# Patient Record
Sex: FEMALE | Race: WHITE | Employment: UNEMPLOYED | URBAN - NONMETROPOLITAN AREA
[De-identification: names, ages, dates, MRNs, and addresses within clinical notes are randomized per-mention and may not be internally consistent; named-entity substitution may affect disease eponyms.]

---

## 2018-09-27 ENCOUNTER — HOSPITAL ENCOUNTER (EMERGENCY)
Age: 33
Discharge: HOME | End: 2018-09-27
Payer: MEDICAID

## 2018-09-27 ENCOUNTER — APPOINTMENT (OUTPATIENT)
Dept: GENERAL RADIOLOGY | Age: 33
End: 2018-09-27
Payer: MEDICAID

## 2018-09-27 VITALS
SYSTOLIC BLOOD PRESSURE: 126 MMHG | DIASTOLIC BLOOD PRESSURE: 58 MMHG | HEART RATE: 98 BPM | BODY MASS INDEX: 35.01 KG/M2 | RESPIRATION RATE: 16 BRPM | OXYGEN SATURATION: 98 % | HEIGHT: 68 IN | WEIGHT: 231 LBS | TEMPERATURE: 98.5 F

## 2018-09-27 DIAGNOSIS — H65.192 OTHER ACUTE NONSUPPURATIVE OTITIS MEDIA OF LEFT EAR, RECURRENCE NOT SPECIFIED: Primary | ICD-10-CM

## 2018-09-27 DIAGNOSIS — F17.200 TOBACCO DEPENDENCE: ICD-10-CM

## 2018-09-27 DIAGNOSIS — J40 BRONCHITIS: ICD-10-CM

## 2018-09-27 PROCEDURE — 99284 EMERGENCY DEPT VISIT MOD MDM: CPT

## 2018-09-27 PROCEDURE — 93005 ELECTROCARDIOGRAM TRACING: CPT

## 2018-09-27 PROCEDURE — 6370000000 HC RX 637 (ALT 250 FOR IP): Performed by: EMERGENCY MEDICINE

## 2018-09-27 PROCEDURE — 99283 EMERGENCY DEPT VISIT LOW MDM: CPT | Performed by: NURSE PRACTITIONER

## 2018-09-27 PROCEDURE — 94640 AIRWAY INHALATION TREATMENT: CPT

## 2018-09-27 PROCEDURE — 71046 X-RAY EXAM CHEST 2 VIEWS: CPT

## 2018-09-27 RX ORDER — CETIRIZINE HYDROCHLORIDE 10 MG/1
10 TABLET ORAL DAILY
Qty: 30 TABLET | Refills: 0 | Status: SHIPPED | OUTPATIENT
Start: 2018-09-27 | End: 2020-11-04

## 2018-09-27 RX ORDER — BUPRENORPHINE AND NALOXONE 8; 2 MG/1; MG/1
1 FILM, SOLUBLE BUCCAL; SUBLINGUAL 2 TIMES DAILY
Status: ON HOLD | COMMUNITY
End: 2020-10-20

## 2018-09-27 RX ORDER — GABAPENTIN 600 MG/1
800 TABLET ORAL 3 TIMES DAILY
Status: ON HOLD | COMMUNITY
End: 2020-10-20

## 2018-09-27 RX ORDER — IPRATROPIUM BROMIDE AND ALBUTEROL SULFATE 2.5; .5 MG/3ML; MG/3ML
1 SOLUTION RESPIRATORY (INHALATION) ONCE
Status: COMPLETED | OUTPATIENT
Start: 2018-09-27 | End: 2018-09-27

## 2018-09-27 RX ORDER — GUAIFENESIN 600 MG/1
1200 TABLET, EXTENDED RELEASE ORAL 2 TIMES DAILY
Qty: 60 TABLET | Refills: 0 | Status: SHIPPED | OUTPATIENT
Start: 2018-09-27 | End: 2018-10-12

## 2018-09-27 RX ORDER — PREDNISONE 20 MG/1
60 TABLET ORAL ONCE
Status: COMPLETED | OUTPATIENT
Start: 2018-09-27 | End: 2018-09-27

## 2018-09-27 RX ORDER — ALBUTEROL SULFATE 90 UG/1
2 AEROSOL, METERED RESPIRATORY (INHALATION) 4 TIMES DAILY
Qty: 1 INHALER | Refills: 0 | Status: ON HOLD | OUTPATIENT
Start: 2018-09-27 | End: 2020-10-20

## 2018-09-27 RX ORDER — AMOXICILLIN 500 MG/1
500 CAPSULE ORAL 3 TIMES DAILY
Qty: 30 CAPSULE | Refills: 0 | Status: SHIPPED | OUTPATIENT
Start: 2018-09-27 | End: 2018-10-07

## 2018-09-27 RX ORDER — METHYLPREDNISOLONE 4 MG/1
TABLET ORAL
Qty: 1 KIT | Refills: 0 | Status: ON HOLD | OUTPATIENT
Start: 2018-09-27 | End: 2020-10-20

## 2018-09-27 RX ADMIN — PREDNISONE 60 MG: 20 TABLET ORAL at 06:46

## 2018-09-27 RX ADMIN — IPRATROPIUM BROMIDE AND ALBUTEROL SULFATE 1 AMPULE: .5; 3 SOLUTION RESPIRATORY (INHALATION) at 06:42

## 2018-09-27 ASSESSMENT — PAIN SCALES - GENERAL: PAINLEVEL_OUTOF10: 5

## 2018-09-27 ASSESSMENT — ENCOUNTER SYMPTOMS
COUGH: 1
ABDOMINAL PAIN: 1
VOMITING: 0
SHORTNESS OF BREATH: 1
SINUS PAIN: 1
SORE THROAT: 1
SINUS PRESSURE: 1
WHEEZING: 1
TROUBLE SWALLOWING: 0
DIARRHEA: 0

## 2018-09-27 ASSESSMENT — PAIN DESCRIPTION - LOCATION: LOCATION: CHEST

## 2018-09-27 ASSESSMENT — PAIN DESCRIPTION - DESCRIPTORS: DESCRIPTORS: THROBBING

## 2018-09-27 NOTE — ED PROVIDER NOTES
oropharyngeal erythema or tonsillar abscesses. Eyes: Pupils are equal, round, and reactive to light. EOM are normal. Right eye exhibits no discharge. Left eye exhibits no discharge. No scleral icterus. Neck: Normal range of motion. No tracheal deviation present. Cardiovascular: Normal rate, regular rhythm and normal heart sounds. No murmur heard. Pulmonary/Chest: Effort normal. No stridor. No respiratory distress. She has wheezes. She has rales. She exhibits no tenderness. Abdominal: Soft. Bowel sounds are normal. She exhibits mass (Umbilical hernia noted. Retractable and soft. ). She exhibits no distension. There is no tenderness. Musculoskeletal: Normal range of motion. Lymphadenopathy:     She has no cervical adenopathy. Neurological: She is alert and oriented to person, place, and time. Skin: Skin is warm and dry. No rash noted. She is not diaphoretic. No erythema. No pallor. Psychiatric: She has a normal mood and affect. Her behavior is normal. Judgment and thought content normal.   Nursing note and vitals reviewed. DIAGNOSTIC RESULTS     RADIOLOGY:   Non-plain film images such as CT, Ultrasound and MRI are read by the radiologist. Plain radiographic images are visualized and preliminarily interpreted by No att. providers found with the below findings:        Interpretation per the Radiologist below, if available at the time of this note:    XR CHEST STANDARD (2 VW)   Final Result   No active cardiopulmonary disease. The above finding are recorded on a digital voice clip in PACS. Signed by Dr Reji Archuleta on 9/27/2018 7:23 AM          LABS:  Labs Reviewed - No data to display    All other labs were within normal range or not returned as of this dictation.     RE-ASSESSMENT          EMERGENCY DEPARTMENT COURSE and DIFFERENTIAL DIAGNOSIS/MDM:   Vitals:    Vitals:    09/27/18 0627 09/27/18 0629 09/27/18 0830   BP:  (!) 141/60 (!) 126/58   Pulse:  100 98   Resp:  20 16   Temp:  98.4

## 2020-04-05 ENCOUNTER — HOSPITAL ENCOUNTER (EMERGENCY)
Facility: HOSPITAL | Age: 35
Discharge: HOME | End: 2020-04-05
Admitting: EMERGENCY MEDICINE

## 2020-04-05 ENCOUNTER — APPOINTMENT (OUTPATIENT)
Dept: CT IMAGING | Facility: HOSPITAL | Age: 35
End: 2020-04-05

## 2020-04-05 VITALS
TEMPERATURE: 98.1 F | BODY MASS INDEX: 37.89 KG/M2 | HEIGHT: 68 IN | HEART RATE: 63 BPM | WEIGHT: 250 LBS | DIASTOLIC BLOOD PRESSURE: 90 MMHG | RESPIRATION RATE: 18 BRPM | OXYGEN SATURATION: 97 % | SYSTOLIC BLOOD PRESSURE: 135 MMHG

## 2020-04-05 DIAGNOSIS — L03.211 FACIAL CELLULITIS: ICD-10-CM

## 2020-04-05 DIAGNOSIS — K04.7 DENTAL ABSCESS: Primary | ICD-10-CM

## 2020-04-05 DIAGNOSIS — Z72.0 TOBACCO USE: ICD-10-CM

## 2020-04-05 LAB
ALBUMIN SERPL-MCNC: 3.8 G/DL (ref 3.5–5.2)
ALBUMIN/GLOB SERPL: 1.4 G/DL
ALP SERPL-CCNC: 89 U/L (ref 39–117)
ALT SERPL W P-5'-P-CCNC: 20 U/L (ref 1–33)
ANION GAP SERPL CALCULATED.3IONS-SCNC: 14 MMOL/L (ref 5–15)
AST SERPL-CCNC: 14 U/L (ref 1–32)
BASOPHILS # BLD AUTO: 0.03 10*3/MM3 (ref 0–0.2)
BASOPHILS NFR BLD AUTO: 0.3 % (ref 0–1.5)
BILIRUB SERPL-MCNC: <0.2 MG/DL (ref 0.2–1.2)
BUN BLD-MCNC: 10 MG/DL (ref 6–20)
BUN/CREAT SERPL: 17.9 (ref 7–25)
CALCIUM SPEC-SCNC: 8.5 MG/DL (ref 8.6–10.5)
CHLORIDE SERPL-SCNC: 103 MMOL/L (ref 98–107)
CO2 SERPL-SCNC: 27 MMOL/L (ref 22–29)
CREAT BLD-MCNC: 0.56 MG/DL (ref 0.57–1)
DEPRECATED RDW RBC AUTO: 48 FL (ref 37–54)
EOSINOPHIL # BLD AUTO: 0.34 10*3/MM3 (ref 0–0.4)
EOSINOPHIL NFR BLD AUTO: 3 % (ref 0.3–6.2)
ERYTHROCYTE [DISTWIDTH] IN BLOOD BY AUTOMATED COUNT: 14.9 % (ref 12.3–15.4)
GFR SERPL CREATININE-BSD FRML MDRD: 125 ML/MIN/1.73
GLOBULIN UR ELPH-MCNC: 2.8 GM/DL
GLUCOSE BLD-MCNC: 130 MG/DL (ref 65–99)
HCT VFR BLD AUTO: 37.2 % (ref 34–46.6)
HGB BLD-MCNC: 12.2 G/DL (ref 12–15.9)
HOLD SPECIMEN: NORMAL
HOLD SPECIMEN: NORMAL
IMM GRANULOCYTES # BLD AUTO: 0.03 10*3/MM3 (ref 0–0.05)
IMM GRANULOCYTES NFR BLD AUTO: 0.3 % (ref 0–0.5)
LYMPHOCYTES # BLD AUTO: 3.89 10*3/MM3 (ref 0.7–3.1)
LYMPHOCYTES NFR BLD AUTO: 34.1 % (ref 19.6–45.3)
MCH RBC QN AUTO: 29 PG (ref 26.6–33)
MCHC RBC AUTO-ENTMCNC: 32.8 G/DL (ref 31.5–35.7)
MCV RBC AUTO: 88.6 FL (ref 79–97)
MONOCYTES # BLD AUTO: 0.91 10*3/MM3 (ref 0.1–0.9)
MONOCYTES NFR BLD AUTO: 8 % (ref 5–12)
NEUTROPHILS # BLD AUTO: 6.21 10*3/MM3 (ref 1.7–7)
NEUTROPHILS NFR BLD AUTO: 54.3 % (ref 42.7–76)
NRBC BLD AUTO-RTO: 0 /100 WBC (ref 0–0.2)
PLATELET # BLD AUTO: 219 10*3/MM3 (ref 140–450)
PMV BLD AUTO: 10.6 FL (ref 6–12)
POTASSIUM BLD-SCNC: 3.1 MMOL/L (ref 3.5–5.2)
PROT SERPL-MCNC: 6.6 G/DL (ref 6–8.5)
RBC # BLD AUTO: 4.2 10*6/MM3 (ref 3.77–5.28)
SODIUM BLD-SCNC: 144 MMOL/L (ref 136–145)
WBC NRBC COR # BLD: 11.41 10*3/MM3 (ref 3.4–10.8)
WHOLE BLOOD HOLD SPECIMEN: NORMAL
WHOLE BLOOD HOLD SPECIMEN: NORMAL

## 2020-04-05 PROCEDURE — 25010000002 DEXAMETHASONE PER 1 MG: Performed by: NURSE PRACTITIONER

## 2020-04-05 PROCEDURE — 96375 TX/PRO/DX INJ NEW DRUG ADDON: CPT

## 2020-04-05 PROCEDURE — 80053 COMPREHEN METABOLIC PANEL: CPT | Performed by: NURSE PRACTITIONER

## 2020-04-05 PROCEDURE — 25010000002 ONDANSETRON PER 1 MG: Performed by: NURSE PRACTITIONER

## 2020-04-05 PROCEDURE — 25010000003 AMPICILLIN-SULBACTAM PER 1.5 G: Performed by: NURSE PRACTITIONER

## 2020-04-05 PROCEDURE — 25010000003 HYDROMORPHONE 1 MG/ML SOLUTION: Performed by: NURSE PRACTITIONER

## 2020-04-05 PROCEDURE — 85025 COMPLETE CBC W/AUTO DIFF WBC: CPT | Performed by: NURSE PRACTITIONER

## 2020-04-05 PROCEDURE — 25010000002 IOPAMIDOL 61 % SOLUTION: Performed by: NURSE PRACTITIONER

## 2020-04-05 PROCEDURE — 99283 EMERGENCY DEPT VISIT LOW MDM: CPT

## 2020-04-05 PROCEDURE — 70487 CT MAXILLOFACIAL W/DYE: CPT

## 2020-04-05 PROCEDURE — 96365 THER/PROPH/DIAG IV INF INIT: CPT

## 2020-04-05 RX ORDER — CLINDAMYCIN HYDROCHLORIDE 150 MG/1
150 CAPSULE ORAL 4 TIMES DAILY
COMMUNITY
End: 2020-04-05 | Stop reason: HOSPADM

## 2020-04-05 RX ORDER — DEXAMETHASONE SODIUM PHOSPHATE 10 MG/ML
10 INJECTION INTRAMUSCULAR; INTRAVENOUS ONCE
Status: COMPLETED | OUTPATIENT
Start: 2020-04-05 | End: 2020-04-05

## 2020-04-05 RX ORDER — BUPRENORPHINE HYDROCHLORIDE AND NALOXONE HYDROCHLORIDE DIHYDRATE 8; 2 MG/1; MG/1
1 TABLET SUBLINGUAL DAILY
COMMUNITY

## 2020-04-05 RX ORDER — ONDANSETRON 2 MG/ML
4 INJECTION INTRAMUSCULAR; INTRAVENOUS ONCE
Status: COMPLETED | OUTPATIENT
Start: 2020-04-05 | End: 2020-04-05

## 2020-04-05 RX ORDER — AMOXICILLIN AND CLAVULANATE POTASSIUM 875; 125 MG/1; MG/1
1 TABLET, FILM COATED ORAL 2 TIMES DAILY
Qty: 20 TABLET | Refills: 0 | Status: SHIPPED | OUTPATIENT
Start: 2020-04-05 | End: 2020-04-15

## 2020-04-05 RX ORDER — METRONIDAZOLE 500 MG/1
500 TABLET ORAL 3 TIMES DAILY
Qty: 30 TABLET | Refills: 0 | Status: SHIPPED | OUTPATIENT
Start: 2020-04-05 | End: 2020-04-15

## 2020-04-05 RX ORDER — OXYCODONE HYDROCHLORIDE AND ACETAMINOPHEN 5; 325 MG/1; MG/1
1 TABLET ORAL EVERY 6 HOURS PRN
Qty: 15 TABLET | Refills: 0 | Status: SHIPPED | OUTPATIENT
Start: 2020-04-05 | End: 2020-07-28 | Stop reason: SDUPTHER

## 2020-04-05 RX ORDER — PAROXETINE 30 MG/1
30 TABLET, FILM COATED ORAL EVERY MORNING
COMMUNITY

## 2020-04-05 RX ADMIN — HYDROMORPHONE HYDROCHLORIDE 1 MG: 1 INJECTION, SOLUTION INTRAMUSCULAR; INTRAVENOUS; SUBCUTANEOUS at 18:45

## 2020-04-05 RX ADMIN — AMPICILLIN SODIUM AND SULBACTAM SODIUM 3 G: 2; 1 INJECTION, POWDER, FOR SOLUTION INTRAMUSCULAR; INTRAVENOUS at 19:29

## 2020-04-05 RX ADMIN — ONDANSETRON HYDROCHLORIDE 4 MG: 2 SOLUTION INTRAMUSCULAR; INTRAVENOUS at 18:45

## 2020-04-05 RX ADMIN — IOPAMIDOL 100 ML: 612 INJECTION, SOLUTION INTRAVENOUS at 19:28

## 2020-04-05 RX ADMIN — SODIUM CHLORIDE 1000 ML: 9 INJECTION, SOLUTION INTRAVENOUS at 18:45

## 2020-04-05 RX ADMIN — DEXAMETHASONE SODIUM PHOSPHATE 10 MG: 10 INJECTION INTRAMUSCULAR; INTRAVENOUS at 18:45

## 2020-07-27 ENCOUNTER — HOSPITAL ENCOUNTER (EMERGENCY)
Facility: HOSPITAL | Age: 35
Discharge: HOME | End: 2020-07-28
Attending: EMERGENCY MEDICINE | Admitting: EMERGENCY MEDICINE

## 2020-07-27 DIAGNOSIS — L02.91 PHLEGMON: ICD-10-CM

## 2020-07-27 DIAGNOSIS — L03.211 FACIAL CELLULITIS: Primary | ICD-10-CM

## 2020-07-27 DIAGNOSIS — K04.7 DENTAL ABSCESS: ICD-10-CM

## 2020-07-27 DIAGNOSIS — K04.7 PERIAPICAL ABSCESS: ICD-10-CM

## 2020-07-27 DIAGNOSIS — K02.9 DENTAL CARIES: ICD-10-CM

## 2020-07-27 LAB
ALBUMIN SERPL-MCNC: 4.4 G/DL (ref 3.5–5.2)
ALBUMIN/GLOB SERPL: 1.5 G/DL
ALP SERPL-CCNC: 89 U/L (ref 39–117)
ALT SERPL W P-5'-P-CCNC: 18 U/L (ref 1–33)
ANION GAP SERPL CALCULATED.3IONS-SCNC: 11 MMOL/L (ref 5–15)
AST SERPL-CCNC: 25 U/L (ref 1–32)
BILIRUB SERPL-MCNC: 0.2 MG/DL (ref 0–1.2)
BUN SERPL-MCNC: 7 MG/DL (ref 6–20)
BUN/CREAT SERPL: 12.7 (ref 7–25)
CALCIUM SPEC-SCNC: 9.3 MG/DL (ref 8.6–10.5)
CHLORIDE SERPL-SCNC: 98 MMOL/L (ref 98–107)
CO2 SERPL-SCNC: 30 MMOL/L (ref 22–29)
CREAT SERPL-MCNC: 0.55 MG/DL (ref 0.57–1)
CRP SERPL-MCNC: 1.85 MG/DL (ref 0–0.5)
ERYTHROCYTE [SEDIMENTATION RATE] IN BLOOD: 15 MM/HR (ref 0–20)
GFR SERPL CREATININE-BSD FRML MDRD: 127 ML/MIN/1.73
GLOBULIN UR ELPH-MCNC: 3 GM/DL
GLUCOSE SERPL-MCNC: 96 MG/DL (ref 65–99)
POTASSIUM SERPL-SCNC: 4.1 MMOL/L (ref 3.5–5.2)
PROT SERPL-MCNC: 7.4 G/DL (ref 6–8.5)
SODIUM SERPL-SCNC: 139 MMOL/L (ref 136–145)

## 2020-07-27 PROCEDURE — 80053 COMPREHEN METABOLIC PANEL: CPT | Performed by: EMERGENCY MEDICINE

## 2020-07-27 PROCEDURE — 86140 C-REACTIVE PROTEIN: CPT | Performed by: EMERGENCY MEDICINE

## 2020-07-27 PROCEDURE — 85007 BL SMEAR W/DIFF WBC COUNT: CPT | Performed by: EMERGENCY MEDICINE

## 2020-07-27 PROCEDURE — 85025 COMPLETE CBC W/AUTO DIFF WBC: CPT | Performed by: EMERGENCY MEDICINE

## 2020-07-27 PROCEDURE — 99283 EMERGENCY DEPT VISIT LOW MDM: CPT

## 2020-07-27 PROCEDURE — 85651 RBC SED RATE NONAUTOMATED: CPT | Performed by: EMERGENCY MEDICINE

## 2020-07-27 RX ORDER — CLINDAMYCIN PHOSPHATE 900 MG/50ML
900 INJECTION INTRAVENOUS ONCE
Status: COMPLETED | OUTPATIENT
Start: 2020-07-27 | End: 2020-07-28

## 2020-07-27 RX ORDER — ONDANSETRON 2 MG/ML
4 INJECTION INTRAMUSCULAR; INTRAVENOUS ONCE
Status: COMPLETED | OUTPATIENT
Start: 2020-07-27 | End: 2020-07-28

## 2020-07-27 RX ORDER — SODIUM CHLORIDE 9 MG/ML
125 INJECTION, SOLUTION INTRAVENOUS CONTINUOUS
Status: DISCONTINUED | OUTPATIENT
Start: 2020-07-27 | End: 2020-07-28 | Stop reason: HOSPADM

## 2020-07-28 ENCOUNTER — APPOINTMENT (OUTPATIENT)
Dept: CT IMAGING | Facility: HOSPITAL | Age: 35
End: 2020-07-28

## 2020-07-28 VITALS
RESPIRATION RATE: 18 BRPM | TEMPERATURE: 98.3 F | WEIGHT: 232 LBS | BODY MASS INDEX: 35.16 KG/M2 | HEIGHT: 68 IN | OXYGEN SATURATION: 95 % | DIASTOLIC BLOOD PRESSURE: 80 MMHG | HEART RATE: 86 BPM | SYSTOLIC BLOOD PRESSURE: 120 MMHG

## 2020-07-28 LAB
DEPRECATED RDW RBC AUTO: 44.7 FL (ref 37–54)
EOSINOPHIL # BLD MANUAL: 0.38 10*3/MM3 (ref 0–0.4)
EOSINOPHIL NFR BLD MANUAL: 3 % (ref 0.3–6.2)
ERYTHROCYTE [DISTWIDTH] IN BLOOD BY AUTOMATED COUNT: 14.2 % (ref 12.3–15.4)
HCT VFR BLD AUTO: 43.3 % (ref 34–46.6)
HGB BLD-MCNC: 14.2 G/DL (ref 12–15.9)
LYMPHOCYTES # BLD MANUAL: 5.78 10*3/MM3 (ref 0.7–3.1)
LYMPHOCYTES NFR BLD MANUAL: 2 % (ref 5–12)
LYMPHOCYTES NFR BLD MANUAL: 45.5 % (ref 19.6–45.3)
MCH RBC QN AUTO: 28.6 PG (ref 26.6–33)
MCHC RBC AUTO-ENTMCNC: 32.8 G/DL (ref 31.5–35.7)
MCV RBC AUTO: 87.1 FL (ref 79–97)
MONOCYTES # BLD AUTO: 0.25 10*3/MM3 (ref 0.1–0.9)
NEUTROPHILS # BLD AUTO: 6.29 10*3/MM3 (ref 1.7–7)
NEUTROPHILS NFR BLD MANUAL: 49.5 % (ref 42.7–76)
PLAT MORPH BLD: NORMAL
PLATELET # BLD AUTO: 237 10*3/MM3 (ref 140–450)
PMV BLD AUTO: 10.5 FL (ref 6–12)
RBC # BLD AUTO: 4.97 10*6/MM3 (ref 3.77–5.28)
RBC MORPH BLD: NORMAL
WBC # BLD AUTO: 12.71 10*3/MM3 (ref 3.4–10.8)
WBC MORPH BLD: NORMAL

## 2020-07-28 PROCEDURE — 25010000002 ONDANSETRON PER 1 MG: Performed by: EMERGENCY MEDICINE

## 2020-07-28 PROCEDURE — 96365 THER/PROPH/DIAG IV INF INIT: CPT

## 2020-07-28 PROCEDURE — 25010000002 DEXAMETHASONE 10 MG/ML SOLUTION: Performed by: EMERGENCY MEDICINE

## 2020-07-28 PROCEDURE — 70487 CT MAXILLOFACIAL W/DYE: CPT

## 2020-07-28 PROCEDURE — 96375 TX/PRO/DX INJ NEW DRUG ADDON: CPT

## 2020-07-28 PROCEDURE — 96361 HYDRATE IV INFUSION ADD-ON: CPT

## 2020-07-28 PROCEDURE — 25010000003 HYDROMORPHONE 1 MG/ML SOLUTION: Performed by: EMERGENCY MEDICINE

## 2020-07-28 PROCEDURE — 25010000002 IOPAMIDOL 61 % SOLUTION: Performed by: EMERGENCY MEDICINE

## 2020-07-28 RX ORDER — OXYCODONE HYDROCHLORIDE AND ACETAMINOPHEN 5; 325 MG/1; MG/1
1 TABLET ORAL EVERY 6 HOURS PRN
Qty: 10 TABLET | Refills: 0 | Status: SHIPPED | OUTPATIENT
Start: 2020-07-28

## 2020-07-28 RX ORDER — PREDNISONE 50 MG/1
50 TABLET ORAL DAILY
Qty: 6 TABLET | Refills: 0 | Status: SHIPPED | OUTPATIENT
Start: 2020-07-28

## 2020-07-28 RX ORDER — AMOXICILLIN AND CLAVULANATE POTASSIUM 875; 125 MG/1; MG/1
1 TABLET, FILM COATED ORAL 2 TIMES DAILY
Qty: 20 TABLET | Refills: 0 | Status: SHIPPED | OUTPATIENT
Start: 2020-07-28

## 2020-07-28 RX ADMIN — CLINDAMYCIN PHOSPHATE 900 MG: 900 INJECTION, SOLUTION INTRAVENOUS at 00:03

## 2020-07-28 RX ADMIN — SODIUM CHLORIDE 125 ML/HR: 9 INJECTION, SOLUTION INTRAVENOUS at 00:06

## 2020-07-28 RX ADMIN — IOPAMIDOL 100 ML: 612 INJECTION, SOLUTION INTRAVENOUS at 00:26

## 2020-07-28 RX ADMIN — ONDANSETRON HYDROCHLORIDE 4 MG: 2 SOLUTION INTRAMUSCULAR; INTRAVENOUS at 00:01

## 2020-07-28 RX ADMIN — DEXAMETHASONE SODIUM PHOSPHATE 20 MG: 10 INJECTION INTRAMUSCULAR; INTRAVENOUS at 00:08

## 2020-07-28 RX ADMIN — HYDROMORPHONE HYDROCHLORIDE 1 MG: 1 INJECTION, SOLUTION INTRAMUSCULAR; INTRAVENOUS; SUBCUTANEOUS at 00:00

## 2020-10-20 ENCOUNTER — APPOINTMENT (OUTPATIENT)
Dept: MRI IMAGING | Age: 35
DRG: 071 | End: 2020-10-20
Attending: HOSPITALIST
Payer: MEDICAID

## 2020-10-20 ENCOUNTER — HOSPITAL ENCOUNTER (INPATIENT)
Age: 35
LOS: 3 days | Discharge: HOME | DRG: 071 | End: 2020-10-23
Attending: HOSPITALIST | Admitting: HOSPITALIST
Payer: MEDICAID

## 2020-10-20 PROBLEM — R93.0 ABNORMAL CT SCAN, HEAD: Status: ACTIVE | Noted: 2020-10-20

## 2020-10-20 PROBLEM — F15.10 AMPHETAMINE ABUSE (HCC): Status: ACTIVE | Noted: 2020-10-20

## 2020-10-20 PROBLEM — G25.2 COARSE TREMOR: Status: ACTIVE | Noted: 2020-10-20

## 2020-10-20 PROBLEM — E66.09 OBESITY DUE TO EXCESS CALORIES: Status: ACTIVE | Noted: 2020-10-20

## 2020-10-20 PROBLEM — F12.90 MARIJUANA USE: Status: ACTIVE | Noted: 2020-10-20

## 2020-10-20 PROBLEM — F19.90 ILLICIT DRUG USE: Status: ACTIVE | Noted: 2020-10-20

## 2020-10-20 PROBLEM — R56.9 NEW ONSET SEIZURE (HCC): Status: ACTIVE | Noted: 2020-10-20

## 2020-10-20 LAB
ANION GAP SERPL CALCULATED.3IONS-SCNC: 15 MMOL/L (ref 7–19)
BASOPHILS ABSOLUTE: 0 K/UL (ref 0–0.2)
BASOPHILS RELATIVE PERCENT: 0.2 % (ref 0–1)
BUN BLDV-MCNC: 9 MG/DL (ref 6–20)
CALCIUM SERPL-MCNC: 9.2 MG/DL (ref 8.6–10)
CHLORIDE BLD-SCNC: 98 MMOL/L (ref 98–111)
CO2: 23 MMOL/L (ref 22–29)
CREAT SERPL-MCNC: 0.6 MG/DL (ref 0.5–0.9)
EOSINOPHILS ABSOLUTE: 0 K/UL (ref 0–0.6)
EOSINOPHILS RELATIVE PERCENT: 0 % (ref 0–5)
GFR AFRICAN AMERICAN: >59
GFR NON-AFRICAN AMERICAN: >60
GLUCOSE BLD-MCNC: 125 MG/DL (ref 74–109)
HCT VFR BLD CALC: 41.2 % (ref 37–47)
HEMOGLOBIN: 13.9 G/DL (ref 12–16)
IMMATURE GRANULOCYTES #: 0.1 K/UL
LYMPHOCYTES ABSOLUTE: 1.6 K/UL (ref 1.1–4.5)
LYMPHOCYTES RELATIVE PERCENT: 9.2 % (ref 20–40)
MAGNESIUM: 2.6 MG/DL (ref 1.6–2.6)
MCH RBC QN AUTO: 28.8 PG (ref 27–31)
MCHC RBC AUTO-ENTMCNC: 33.7 G/DL (ref 33–37)
MCV RBC AUTO: 85.3 FL (ref 81–99)
MONOCYTES ABSOLUTE: 1.4 K/UL (ref 0–0.9)
MONOCYTES RELATIVE PERCENT: 7.6 % (ref 0–10)
NEUTROPHILS ABSOLUTE: 14.6 K/UL (ref 1.5–7.5)
NEUTROPHILS RELATIVE PERCENT: 82.5 % (ref 50–65)
PDW BLD-RTO: 14.7 % (ref 11.5–14.5)
PHOSPHORUS: 3.4 MG/DL (ref 2.5–4.5)
PLATELET # BLD: 280 K/UL (ref 130–400)
PMV BLD AUTO: 10.2 FL (ref 9.4–12.3)
POTASSIUM SERPL-SCNC: 3 MMOL/L (ref 3.5–5)
RBC # BLD: 4.83 M/UL (ref 4.2–5.4)
SODIUM BLD-SCNC: 136 MMOL/L (ref 136–145)
TOTAL CK: 1058 U/L (ref 26–192)
TSH REFLEX FT4: 1.24 UIU/ML (ref 0.35–5.5)
WBC # BLD: 17.7 K/UL (ref 4.8–10.8)

## 2020-10-20 PROCEDURE — 6360000002 HC RX W HCPCS: Performed by: PSYCHIATRY & NEUROLOGY

## 2020-10-20 PROCEDURE — 009U3ZX DRAINAGE OF SPINAL CANAL, PERCUTANEOUS APPROACH, DIAGNOSTIC: ICD-10-PCS | Performed by: HOSPITALIST

## 2020-10-20 PROCEDURE — 84443 ASSAY THYROID STIM HORMONE: CPT

## 2020-10-20 PROCEDURE — 6360000002 HC RX W HCPCS: Performed by: HOSPITALIST

## 2020-10-20 PROCEDURE — 99223 1ST HOSP IP/OBS HIGH 75: CPT | Performed by: PSYCHIATRY & NEUROLOGY

## 2020-10-20 PROCEDURE — 1210000000 HC MED SURG R&B

## 2020-10-20 PROCEDURE — 80048 BASIC METABOLIC PNL TOTAL CA: CPT

## 2020-10-20 PROCEDURE — 84100 ASSAY OF PHOSPHORUS: CPT

## 2020-10-20 PROCEDURE — 82550 ASSAY OF CK (CPK): CPT

## 2020-10-20 PROCEDURE — 85025 COMPLETE CBC W/AUTO DIFF WBC: CPT

## 2020-10-20 PROCEDURE — 2580000003 HC RX 258: Performed by: HOSPITALIST

## 2020-10-20 PROCEDURE — 87040 BLOOD CULTURE FOR BACTERIA: CPT

## 2020-10-20 PROCEDURE — 36415 COLL VENOUS BLD VENIPUNCTURE: CPT

## 2020-10-20 PROCEDURE — 2580000003 HC RX 258: Performed by: PSYCHIATRY & NEUROLOGY

## 2020-10-20 PROCEDURE — 83735 ASSAY OF MAGNESIUM: CPT

## 2020-10-20 RX ORDER — ONDANSETRON 2 MG/ML
4 INJECTION INTRAMUSCULAR; INTRAVENOUS EVERY 6 HOURS PRN
Status: DISCONTINUED | OUTPATIENT
Start: 2020-10-20 | End: 2020-10-23 | Stop reason: HOSPADM

## 2020-10-20 RX ORDER — POLYETHYLENE GLYCOL 3350 17 G/17G
17 POWDER, FOR SOLUTION ORAL DAILY PRN
Status: DISCONTINUED | OUTPATIENT
Start: 2020-10-20 | End: 2020-10-23 | Stop reason: HOSPADM

## 2020-10-20 RX ORDER — POTASSIUM CHLORIDE 20 MEQ/1
40 TABLET, EXTENDED RELEASE ORAL PRN
Status: DISCONTINUED | OUTPATIENT
Start: 2020-10-20 | End: 2020-10-23 | Stop reason: HOSPADM

## 2020-10-20 RX ORDER — SODIUM CHLORIDE 0.9 % (FLUSH) 0.9 %
10 SYRINGE (ML) INJECTION EVERY 12 HOURS SCHEDULED
Status: DISCONTINUED | OUTPATIENT
Start: 2020-10-20 | End: 2020-10-23 | Stop reason: HOSPADM

## 2020-10-20 RX ORDER — MAGNESIUM SULFATE 1 G/100ML
1 INJECTION INTRAVENOUS PRN
Status: DISCONTINUED | OUTPATIENT
Start: 2020-10-20 | End: 2020-10-23 | Stop reason: HOSPADM

## 2020-10-20 RX ORDER — THIAMINE HYDROCHLORIDE 100 MG/ML
100 INJECTION, SOLUTION INTRAMUSCULAR; INTRAVENOUS DAILY
Status: DISCONTINUED | OUTPATIENT
Start: 2020-10-20 | End: 2020-10-23 | Stop reason: HOSPADM

## 2020-10-20 RX ORDER — PAROXETINE HYDROCHLORIDE 20 MG/1
40 TABLET, FILM COATED ORAL EVERY MORNING
Status: DISCONTINUED | OUTPATIENT
Start: 2020-10-21 | End: 2020-10-20

## 2020-10-20 RX ORDER — ACETAMINOPHEN 650 MG/1
650 SUPPOSITORY RECTAL EVERY 6 HOURS PRN
Status: DISCONTINUED | OUTPATIENT
Start: 2020-10-20 | End: 2020-10-23 | Stop reason: HOSPADM

## 2020-10-20 RX ORDER — SODIUM CHLORIDE 9 MG/ML
INJECTION, SOLUTION INTRAVENOUS CONTINUOUS
Status: DISCONTINUED | OUTPATIENT
Start: 2020-10-20 | End: 2020-10-23 | Stop reason: HOSPADM

## 2020-10-20 RX ORDER — DEXTROSE AND SODIUM CHLORIDE 5; .45 G/100ML; G/100ML
100 INJECTION, SOLUTION INTRAVENOUS CONTINUOUS
Status: DISCONTINUED | OUTPATIENT
Start: 2020-10-20 | End: 2020-10-20

## 2020-10-20 RX ORDER — BUPRENORPHINE AND NALOXONE 8; 2 MG/1; MG/1
1 FILM, SOLUBLE BUCCAL; SUBLINGUAL 2 TIMES DAILY
Status: DISCONTINUED | OUTPATIENT
Start: 2020-10-20 | End: 2020-10-21 | Stop reason: CLARIF

## 2020-10-20 RX ORDER — FAMOTIDINE 20 MG/1
20 TABLET, FILM COATED ORAL 2 TIMES DAILY
Status: DISCONTINUED | OUTPATIENT
Start: 2020-10-20 | End: 2020-10-23 | Stop reason: HOSPADM

## 2020-10-20 RX ORDER — CIPROFLOXACIN 500 MG/1
500 TABLET, FILM COATED ORAL 2 TIMES DAILY
Status: ON HOLD | COMMUNITY
End: 2020-10-23 | Stop reason: HOSPADM

## 2020-10-20 RX ORDER — LORAZEPAM 2 MG/ML
1 INJECTION INTRAMUSCULAR ONCE
Status: COMPLETED | OUTPATIENT
Start: 2020-10-20 | End: 2020-10-20

## 2020-10-20 RX ORDER — 0.9 % SODIUM CHLORIDE 0.9 %
1000 INTRAVENOUS SOLUTION INTRAVENOUS ONCE
Status: COMPLETED | OUTPATIENT
Start: 2020-10-20 | End: 2020-10-20

## 2020-10-20 RX ORDER — LORAZEPAM 2 MG/ML
1 INJECTION INTRAMUSCULAR PRN
Status: DISCONTINUED | OUTPATIENT
Start: 2020-10-20 | End: 2020-10-23 | Stop reason: HOSPADM

## 2020-10-20 RX ORDER — PAROXETINE HYDROCHLORIDE 40 MG/1
40 TABLET, FILM COATED ORAL EVERY MORNING
COMMUNITY

## 2020-10-20 RX ORDER — BUPRENORPHINE HYDROCHLORIDE AND NALOXONE HYDROCHLORIDE DIHYDRATE 8; 2 MG/1; MG/1
2.5 TABLET SUBLINGUAL DAILY
Status: ON HOLD | COMMUNITY
End: 2020-10-23 | Stop reason: HOSPADM

## 2020-10-20 RX ORDER — POTASSIUM CHLORIDE 7.45 MG/ML
10 INJECTION INTRAVENOUS PRN
Status: DISCONTINUED | OUTPATIENT
Start: 2020-10-20 | End: 2020-10-23 | Stop reason: HOSPADM

## 2020-10-20 RX ORDER — LORAZEPAM 2 MG/ML
1 INJECTION INTRAMUSCULAR EVERY 4 HOURS PRN
Status: DISCONTINUED | OUTPATIENT
Start: 2020-10-20 | End: 2020-10-20 | Stop reason: SDUPTHER

## 2020-10-20 RX ORDER — LEVETIRACETAM 10 MG/ML
1000 INJECTION INTRAVASCULAR ONCE
Status: COMPLETED | OUTPATIENT
Start: 2020-10-20 | End: 2020-10-20

## 2020-10-20 RX ORDER — LEVETIRACETAM 5 MG/ML
500 INJECTION INTRAVASCULAR EVERY 12 HOURS
Status: DISCONTINUED | OUTPATIENT
Start: 2020-10-21 | End: 2020-10-21

## 2020-10-20 RX ORDER — CIPROFLOXACIN 500 MG/1
500 TABLET, FILM COATED ORAL 2 TIMES DAILY
Status: DISCONTINUED | OUTPATIENT
Start: 2020-10-20 | End: 2020-10-23 | Stop reason: HOSPADM

## 2020-10-20 RX ORDER — PROMETHAZINE HYDROCHLORIDE 12.5 MG/1
12.5 TABLET ORAL EVERY 6 HOURS PRN
Status: DISCONTINUED | OUTPATIENT
Start: 2020-10-20 | End: 2020-10-23 | Stop reason: HOSPADM

## 2020-10-20 RX ORDER — SODIUM CHLORIDE 0.9 % (FLUSH) 0.9 %
10 SYRINGE (ML) INJECTION PRN
Status: DISCONTINUED | OUTPATIENT
Start: 2020-10-20 | End: 2020-10-23 | Stop reason: HOSPADM

## 2020-10-20 RX ORDER — CETIRIZINE HYDROCHLORIDE 10 MG/1
10 TABLET ORAL DAILY
Status: DISCONTINUED | OUTPATIENT
Start: 2020-10-20 | End: 2020-10-23 | Stop reason: HOSPADM

## 2020-10-20 RX ORDER — ACETAMINOPHEN 325 MG/1
650 TABLET ORAL EVERY 6 HOURS PRN
Status: DISCONTINUED | OUTPATIENT
Start: 2020-10-20 | End: 2020-10-23 | Stop reason: HOSPADM

## 2020-10-20 RX ADMIN — VANCOMYCIN HYDROCHLORIDE 1500 MG: 10 INJECTION, POWDER, LYOPHILIZED, FOR SOLUTION INTRAVENOUS at 23:04

## 2020-10-20 RX ADMIN — ACYCLOVIR SODIUM 650 MG: 50 INJECTION, SOLUTION INTRAVENOUS at 21:54

## 2020-10-20 RX ADMIN — LORAZEPAM 1 MG: 2 INJECTION INTRAMUSCULAR; INTRAVENOUS at 20:10

## 2020-10-20 RX ADMIN — LORAZEPAM 1 MG: 2 INJECTION INTRAMUSCULAR; INTRAVENOUS at 19:55

## 2020-10-20 RX ADMIN — LEVETIRACETAM 1000 MG: 10 INJECTION INTRAVENOUS at 16:59

## 2020-10-20 RX ADMIN — DEXTROSE AND SODIUM CHLORIDE 100 ML/HR: 5; 450 INJECTION, SOLUTION INTRAVENOUS at 16:59

## 2020-10-20 RX ADMIN — SODIUM CHLORIDE, PRESERVATIVE FREE 2 G: 5 INJECTION INTRAVENOUS at 21:09

## 2020-10-20 RX ADMIN — SODIUM CHLORIDE 1000 ML: 9 INJECTION, SOLUTION INTRAVENOUS at 17:24

## 2020-10-20 NOTE — PROGRESS NOTES
Upon arrival to floor notified Dr. Beranna Fairbanks of patients left leg tremors and diaphoresis. He examined patient at the bedside    Dr Mandeep Mukherjee assessed patient at [facility]. .     Notified of oral temp of 101.2. Orders placed. Will continue to monitor.

## 2020-10-20 NOTE — CONSULTS
[facility] Neurology Consult      Patient:   Alexandre Clarke  MR#:    [id number]  Account Number:                   [id number]      Room:    15/515-01   YOB: 1985  Date of Progress Note: 10/20/2020  Time of Note                           5:00 PM  Attending Physician:  Everett Lane MD  Consulting Physician:  Elisabeth Castaneda DO       CHIEF COMPLAINT:  Seizure     HISTORY OF PRESENT ILLNESS:   This is a 37 y.o. female who was admitted with possible seizure and confusion. She was transferred here from an outside emergency department with new onset seizure activity. Multiple events noted. Treated with ativan at the outside facility. Nursing has noted some right side focal shaking / twitching while her. No further INO or GTC events noted. She has a chronic history of illicit drug use and is currently on Suboxone therapy. UDS was positive for THC and amphetamines. The patient has been noting abdominal discomfort and increased nausea and vomiting over the last few days as well. She has had a prior hernia repair. Denies alcohol use. Denies headaches, neck stiffness, or fever. She is diaphoretic, but denies chest pain. She denies shortness of breath. She appears tremulous at times. Possible tongue biting noted, dried blood around her mouth and nose noted. CT abnormal with possible parietal lesions noted per records. No history of TBI, meningitis, or encephalitis noted. Does have a family history of seizures. REVIEW OF SYSTEMS:  Constitutional - No fever   HENT -  No Scalp tenderness. No tinnitus or significant hearing loss. No nose bleeding, no sore throat. Eyes - No sudden vision change or eye pain  Respiratory - No cough  Cardiovascular - No chest pain. Gastrointestinal - No overt abdominal pain. Genitourinary - No difficulty urinating, dysuria  Musculoskeletal - No back pain or myalgia. Skin - No color change or rash  Neurologic - No clear lateralizing weakness.   No numbness. Hematologic - No easy bruising or spontaneous bleeding. Psychiatric - Anxiety. PAST MEDICAL HISTORY:      Diagnosis Date    Anxiety     Depression        PAST SURGICAL HISTORY:      Procedure Laterality Date    CHOLECYSTECTOMY      DILATION AND CURETTAGE OF UTERUS      x2    HERNIA REPAIR      HYSTERECTOMY      TUBAL LIGATION         SOCIAL HISTORY:   TOBACCO:   reports that she has been smoking cigarettes. She has been smoking about 1.50 packs per day. She has never used smokeless tobacco.  ETOH:   reports current alcohol use.   DRUG:    Social History     Substance and Sexual Activity   Drug Use Yes    Types: Marijuana       FAMILY HISTORY:       Problem Relation Age of Onset    Diabetes Mother     Cancer Father        MEDICATIONS:      Current Facility-Administered Medications:     buprenorphine-naloxone (SUBOXONE) 8-2 MG SL film 1 Film, 1 Film, Sublingual, BID, Nilesh Gallegos MD    cetirizine (ZYRTEC) tablet 10 mg, 10 mg, Oral, Daily, Nilesh Gallegos MD    ciprofloxacin (CIPRO) tablet 500 mg, 500 mg, Oral, BID, Nilesh Gallegos MD    [START ON 10/21/2020] PARoxetine (PAXIL) tablet 40 mg, 40 mg, Oral, QAM, Nilesh Gallegos MD    sodium chloride flush 0.9 % injection 10 mL, 10 mL, Intravenous, 2 times per day, Nilesh Gallegos MD    sodium chloride flush 0.9 % injection 10 mL, 10 mL, Intravenous, PRN, Nilesh Gallegos MD    potassium chloride (KLOR-CON M) extended release tablet 40 mEq, 40 mEq, Oral, PRN **OR** potassium bicarb-citric acid (EFFER-K) effervescent tablet 40 mEq, 40 mEq, Oral, PRN **OR** potassium chloride 10 mEq/100 mL IVPB (Peripheral Line), 10 mEq, Intravenous, PRN, Nilesh Gallegos MD    magnesium sulfate 1 g in dextrose 5% 100 mL IVPB, 1 g, Intravenous, PRN, Nilesh Gallegos MD    acetaminophen (TYLENOL) tablet 650 mg, 650 mg, Oral, Q6H PRN **OR** acetaminophen (TYLENOL) suppository 650 mg, 650 mg, Rectal, Q6H PRN, Nilesh Gallegos MD    polyethylene glycol (GLYCOLAX) packet 17 g, 17 g, Oral, Daily PRN, Nilesh Gallegos MD    promethazine (PHENERGAN) tablet 12.5 mg, 12.5 mg, Oral, Q6H PRN **OR** ondansetron (ZOFRAN) injection 4 mg, 4 mg, Intravenous, Q6H PRN, Nilesh Gallegos MD    famotidine (PEPCID) tablet 20 mg, 20 mg, Oral, BID, Nilesh Gallegos MD    enoxaparin (LOVENOX) injection 40 mg, 40 mg, Subcutaneous, Daily, Nilesh Gallegos MD    levetiracetam (KEPPRA) 1000 mg/100 mL IVPB, 1,000 mg, Intravenous, Once, Nilesh Gallegos MD, 1,000 mg at 10/20/20 1659    [START ON 10/21/2020] levetiracetam (KEPPRA) 500 mg/100 mL IVPB, 500 mg, Intravenous, Q12H, Nilesh Gallegos MD    LORazepam (ATIVAN) injection 1 mg, 1 mg, Intravenous, Q4H PRN, Nilesh Gallegos MD    dextrose 5 % and 0.45 % NaCl infusion, 100 mL/hr, Intravenous, Continuous, Nilesh Gallegos MD, Last Rate: 100 mL/hr at 10/20/20 1659, 100 mL/hr at 10/20/20 1659    ALLERGIES:    Patient has no known allergies. PHYSICAL EXAM:    Constitutional -   /85   Pulse 106   Temp 99.8 °F (37.7 °C) (Temporal)   Resp 20   SpO2 94%   General appearance: No acute distress   EYES -   Conjunctiva normal  Pupillary exam as below, see CN exam in the neurologic exam  ENT-    No scars, masses, or lesions over external nose or ears  Oropharynx without erythema, palate midline, tongue biting noted  Cardiovascular -   No clubbing, cyanosis, or edema   Pulmonary-   Good expansion, normal effort without use of accessory muscles  Musculoskeletal -   No significant wasting of muscles noted  Gait as below, see gait exam in the neurologic exam  Muscle strength, tone, stability as below see the motor exam in the neurologic exam.   No bony deformities  Skin -   Warm, dry, and intact to inspection and palpation.     No rash, erythema, or pallor  Psychiatric -   Mood, affect, and behavior appears anxious   Memory as below see mental status examination in the neurologic exam      NEUROLOGICAL EXAM    Mental status   [] Awake, alert, oriented   [] Affect attention and concentration appear hypercoagulable testing pending workup. Need MRI to clarify. 2.  Loaded with Keppra, will continue Keppra 500 mg q12h, check EEG  3. Ativan prn, seizure precautions. 4.  Thiamine  5. Monitor closely with withdrawal, ativan prn.  6.  Check additional labs including thyroid, cpk, hold serotonergic medications and antipsychotic medications for now though NMS or serotonin syndrome felt somewhat unlikely. 7.  She is now febrile per nursing, but no headache, neck stiffness and do not feel CT changes would be meningitis related. Consider LP if worsens and no other source for fever identified and once MRI findings are clarified. May need repeat CXR, UA, follow up ECHO to exclude endocarditis unclear if may have a history of IV drug use, and consider further abdominal workup. Also, r/o intracranial abscess with MRI as well given recent dental work and abnormal CT. Will go ahead and cover with empiric antimicrobials to cover CNS infection and will continue pending above. 8.  Antiplatelet vs anticoagulation, statin, permissive hypertension pending above. Hold lovenox for now as may need LP, scd's. Please feel free to call with any questions. [phone] (cell phone).     Kat Guillermo, DO  Board Certified Neurology

## 2020-10-20 NOTE — PROGRESS NOTES
Pharmacy Note  Vancomycin Consult    Vera Roman is a 37 y.o. female started on Vancomycin for central nervous system infection; consult received from Dr. Jr Schofield to manage therapy. Also receiving the following antibiotics: ceftriaxone, acyclovir. Principal Problem:    New onset seizure ([address]  Active Problems:    Illicit drug use    Obesity due to excess calories    Coarse tremor    Amphetamine abuse (HCC)    Marijuana use    Abnormal CT scan, head  Resolved Problems:    * No resolved hospital problems. *      Allergies:  Patient has no known allergies. Temp max: 101.2    Recent Labs     10/20/20  1649   BUN 9       Recent Labs     10/20/20  1649   CREATININE 0.6       Recent Labs     10/20/20  1649   WBC 17.7*       No intake or output data in the 24 hours ending 10/20/20 1849    Culture Date Source Results   10/20/20 Blood ordered   10/20/20 Urine ordered            Ht Readings from Last 1 Encounters:   10/20/20 5' 8\" (1.727 m)        Wt Readings from Last 1 Encounters:   10/20/20 253 lb (114.8 kg)         Body mass index is 38.47 kg/m². Estimated Creatinine Clearance: 177 mL/min (based on SCr of 0.6 mg/dL). Assessment/Plan:  Will initiate vancomycin 1500 mg IV every 8 hours. Timing of trough level will be determined based on culture results, renal function, and clinical response. Thank you for the consult. Will continue to follow.     Electronically signed by Leslie Lim [address] [place] [address] on 10/20/2020 at 6:49 PM

## 2020-10-20 NOTE — H&P
[place], [place], [address]        DEPARTMENT OF HOSPITALIST MEDICINE        HISTORY & PHYSICAL:          REASON FOR ADMISSION:  No chief complaint on file. HISTORY OF PRESENT ILLNESS:  Keaton Baird is an 37 y.o. female. She is a very unfortunate lady with chronic history of illicit drug use who came to the transferring  ER with altered mental status and had winessed seizures. Patient was given IV Ativan which helped a bit. CAT scan of the head was done which showed 2 new parietal lesions as compared to prior CAT scan from 2019. Her Urine drug screen was positive for THC and amphetamines. Patient was transferred to our hospital via EMS. Enroute she had 3 more seizures. The patient came to the ER, I rushed to evaluate the patient and she was having body tremors. I ordered IV Ativan as needed, and loaded her with Keppra followed by maintenance Keppra dose and also asked for neurology evaluation. She will be admitted for medical management, cardiopulmonary monitoring, close neurologic evaluation and follow-up.       PAST MEDICAL HISTORY:  Past Medical History:   Diagnosis Date    Anxiety     Depression          PAST SURGICAL HISTORY:  Past Surgical History:   Procedure Laterality Date    CHOLECYSTECTOMY      DILATION AND CURETTAGE OF UTERUS      x2    HERNIA REPAIR      HYSTERECTOMY      TUBAL LIGATION          SOCIAL HISTORY:  Social History     Socioeconomic History    Marital status:      Spouse name: None    Number of children: 3    Years of education: None    Highest education level: None   Occupational History    None   Social Needs    Financial resource strain: None    Food insecurity     Worry: None     Inability: None    Transportation needs     Medical: None     Non-medical: None   Tobacco Use    Smoking status: Current Every Day Smoker     Packs/day: 1.50     Types: Cigarettes    Smokeless tobacco: Never Used   Substance and Sexual Activity    Alcohol use: Yes     Comment: infrequent    Drug use: Yes     Types: Marijuana    Sexual activity: None   Lifestyle    Physical activity     Days per week: None     Minutes per session: None    Stress: None   Relationships    Social connections     Talks on phone: None     Gets together: None     Attends [employer] service: None     Active member of club or organization: None     Attends meetings of clubs or organizations: None     Relationship status: None    Intimate partner violence     Fear of current or ex partner: None     Emotionally abused: None     Physically abused: None     Forced sexual activity: None   Other Topics Concern    None   Social History Narrative    None        FAMILY HISTORY:  Family History   Problem Relation Age of Onset    Diabetes Mother     Cancer Father          ALLERGIES:  No Known Allergies     PRIOR TO ADMISSION MEDS:  Medications Prior to Admission: ciprofloxacin (CIPRO) 500 MG tablet, Take 500 mg by mouth 2 times daily 4 tablets left to take  PARoxetine (PAXIL) 40 MG tablet, Take 40 mg by mouth every morning  buprenorphine-naloxone (SUBOXONE) 8-2 MG FILM SL film, Place 1 Film under the tongue 2 times daily. .  cetirizine (ZYRTEC ALLERGY) 10 MG tablet, Take 1 tablet by mouth daily  [DISCONTINUED] gabapentin (NEURONTIN) 600 MG tablet, Take 800 mg by mouth 3 times daily. .  [DISCONTINUED] albuterol sulfate HFA (PROVENTIL HFA) 108 (90 Base) MCG/ACT inhaler, Inhale 2 puffs into the lungs 4 times daily  [DISCONTINUED] methylPREDNISolone (MEDROL, QUIRINO,) 4 MG tablet, Take by mouth. [DISCONTINUED] citalopram (CELEXA) 20 MG tablet, Take 20 mg by mouth daily. REVIEW OF SYSTEMS:    Unable to be obtained . .. Patient is pleasantly confused! PHYSICAL EXAM:  /85   Pulse 106   Temp 99.8 °F (37.7 °C) (Temporal)   Resp 20   SpO2 94%   No intake/output data recorded.       PHYSICAL EXAMINATION:    Vital Signs: Please see the chart   VANESSA:  Patient is pleasantly confused, appears tired and fatigued   Head/Eyes:  Normocephalic, atraumatic, EOMI and PERRLA bilaterally   ENT: Moist mucous membranes, nasal passages clear   Neck: Supple, full range of motion, no carotid bruit, trachea midline   Respiratory:   Bilateral fair air entry in both lung fields, mild B/L crackles, symmetric expansion of chest   Cardiovascular:  Regular rate and rhythm, S1+S2+0, no murmurs/rubs   Urology: No bilateral CVA tenderness, no suprapubic tenderness   Abdomen:   Soft, non-tender, bowel sounds +ve, no organomegaly   Muscle/Joints: Moves all, full range of motion, no muscle spasms   Extremities: No clubbing, no cyanosis, no calf tenderness, no edema   Pulses: 2+ bilaterally, symmetrical   Skin: Warm, dry, no pallor/cyanosis/jaundice, no rashes/lesions   Neurologic:  Unable to be obtained . .. Patient is pleasantly confused! Psychiatric:  Unable to be obtained . .. Patient is pleasantly confused! LABORATORY DATA:    CBC:No results for input(s): WBC, HGB, HCT, PLT in the last 72 hours. BMP:No results for input(s): NA, K, CL, CO2, BUN, CREATININE, CALCIUM, PHOS in the last 72 hours. Invalid input(s): Arvella Good results for input(s): AST, ALT, BILIDIR, BILITOT, ALKPHOS in the last 72 hours. Coag Panel: No results for input(s): INR, PROTIME, APTT in the last 72 hours. Cardiac Enzymes: No results for input(s): Lore Lowers in the last 72 hours. ABGs:No results found for: PHART, PO2ART, MTN4WDG  Urinalysis:  Lab Results   Component Value Date    NITRU NEGATIVE 10/02/2011    GLUCOSEU NEGATIVE 10/02/2011     A1C: No results for input(s): LABA1C in the last 72 hours. ABG:No results for input(s): PHART, ZAG7LGR, PO2ART, DPK4LUC, BEART, HGBAE, J2JEVUAU, CARBOXHGBART in the last 72 hours. EKG:   Please see chart      IMAGING:  No results found.       Assessment and Plan:    Principal Problem:    New onset seizure ([address]  Active Problems:    Illicit drug use    Obesity due to excess calories    Coarse tremor Amphetamine abuse (HCC)    Marijuana use    Abnormal CT scan, head  Resolved Problems:    * No resolved hospital problems. *         Admit patient to medical unit under full inpatient status  Continuous cardiac telemetry ordered  Patient started on Ativan 1 mg IV every 4 hours as needed for agitation/seizures  Patient loaded with Keppra 1000 mg IV x1 dose  Continue with Keppra 500 mg IV twice daily  Neurology consult given for evaluation and further treatment recommendations  Seizure precautions ordered  Strict I's and O's  Monitor patient for withdrawal from illicit meds      Repeat labs in a.m. Electrolyte replacement as per protocol. Patient will be monitored very closely on the floor. Further recommendations as per the hospital course. Patient  is on DVT prophylaxis  Current medications reviewed  Lab work reviewed  Radiology/Chest x-ray films reviewed  Discussed with the nurse and addressed all questions/concerns        Attestation:  A minimum of two midnights of inpatient hospital care is anticipated to be required based on the patient's clinical condition which requires intensive medical therapy. At this time the patient is not clinically optimized for safe discharge. Kathi Sargent MD  4:48 PM 10/20/2020      DISCLAIMER: This note was created with electronic voice recognition which does have occasional errors. If you have any questions regarding the content within the note please do not hesitate to contact me. .. Thanks.

## 2020-10-21 ENCOUNTER — APPOINTMENT (OUTPATIENT)
Dept: CT IMAGING | Age: 35
DRG: 071 | End: 2020-10-21
Attending: HOSPITALIST
Payer: MEDICAID

## 2020-10-21 ENCOUNTER — APPOINTMENT (OUTPATIENT)
Dept: GENERAL RADIOLOGY | Age: 35
DRG: 071 | End: 2020-10-21
Attending: HOSPITALIST
Payer: MEDICAID

## 2020-10-21 ENCOUNTER — APPOINTMENT (OUTPATIENT)
Dept: MRI IMAGING | Age: 35
DRG: 071 | End: 2020-10-21
Attending: HOSPITALIST
Payer: MEDICAID

## 2020-10-21 PROBLEM — E87.6 HYPOKALEMIA: Status: ACTIVE | Noted: 2020-10-21

## 2020-10-21 LAB
ANION GAP SERPL CALCULATED.3IONS-SCNC: 15 MMOL/L (ref 7–19)
APPEARANCE CSF: CLEAR
APTT: 30 SEC (ref 26–36.2)
BACTERIA: NEGATIVE /HPF
BILIRUBIN URINE: NEGATIVE
BLOOD, URINE: ABNORMAL
BUN BLDV-MCNC: 8 MG/DL (ref 6–20)
C-REACTIVE PROTEIN: 5.2 MG/DL (ref 0–0.5)
CALCIUM SERPL-MCNC: 8.8 MG/DL (ref 8.6–10)
CHLORIDE BLD-SCNC: 101 MMOL/L (ref 98–111)
CLARITY: CLEAR
CLOT EVALUATION CSF: ABNORMAL
CO2: 21 MMOL/L (ref 22–29)
COLOR CSF: COLORLESS
COLOR: YELLOW
CREAT SERPL-MCNC: 0.5 MG/DL (ref 0.5–0.9)
CRYPTOCOCCAL ANTIGEN CSF: NEGATIVE
CRYSTALS, UA: ABNORMAL /HPF
EPITHELIAL CELLS, UA: 1 /HPF (ref 0–5)
GFR AFRICAN AMERICAN: >59
GFR NON-AFRICAN AMERICAN: >60
GLUCOSE BLD-MCNC: 125 MG/DL (ref 74–109)
GLUCOSE URINE: NEGATIVE MG/DL
GLUCOSE, CSF: 90 MG/DL (ref 40–70)
HCT VFR BLD CALC: 40.1 % (ref 37–47)
HEMOGLOBIN: 13.1 G/DL (ref 12–16)
HYALINE CASTS: 1 /HPF (ref 0–8)
INR BLD: 1.08 (ref 0.88–1.18)
KETONES, URINE: ABNORMAL MG/DL
LEUKOCYTE ESTERASE, URINE: NEGATIVE
LV EF: 38 %
LVEF MODALITY: NORMAL
MCH RBC QN AUTO: 29 PG (ref 27–31)
MCHC RBC AUTO-ENTMCNC: 32.7 G/DL (ref 33–37)
MCV RBC AUTO: 88.9 FL (ref 81–99)
NITRITE, URINE: NEGATIVE
NO DIFFERENTIAL CSF: ABNORMAL
PDW BLD-RTO: 14.7 % (ref 11.5–14.5)
PH UA: 5.5 (ref 5–8)
PLATELET # BLD: 266 K/UL (ref 130–400)
PMV BLD AUTO: 10.5 FL (ref 9.4–12.3)
POTASSIUM SERPL-SCNC: 3 MMOL/L (ref 3.5–5)
PROTEIN CSF: 24 MG/DL (ref 15–45)
PROTEIN UA: NEGATIVE MG/DL
PROTHROMBIN TIME: 13.9 SEC (ref 12–14.6)
RBC # BLD: 4.51 M/UL (ref 4.2–5.4)
RBC CSF: 219 /CUMM (ref 0–5)
RBC UA: 2 /HPF (ref 0–4)
RHEUMATOID FACTOR: 11 IU/ML
SEDIMENTATION RATE, ERYTHROCYTE: 15 MM/HR (ref 0–20)
SODIUM BLD-SCNC: 137 MMOL/L (ref 136–145)
SPECIFIC GRAVITY UA: 1.02 (ref 1–1.03)
TOTAL CK: 1319 U/L (ref 26–192)
TUBE NUMBER CSF: ABNORMAL
UROBILINOGEN, URINE: 0.2 E.U./DL
VANCOMYCIN TROUGH: 13.6 UG/ML (ref 10–20)
VITAMIN B-12: 272 PG/ML (ref 211–946)
WBC # BLD: 19.4 K/UL (ref 4.8–10.8)
WBC CSF: 4 /CUMM (ref 0–8)
WBC UA: 2 /HPF (ref 0–5)

## 2020-10-21 PROCEDURE — 86618 LYME DISEASE ANTIBODY: CPT

## 2020-10-21 PROCEDURE — 2700000000 HC OXYGEN THERAPY PER DAY

## 2020-10-21 PROCEDURE — 86651 ENCEPHALITIS CALIFORN ANTBDY: CPT

## 2020-10-21 PROCEDURE — 87070 CULTURE OTHR SPECIMN AEROBIC: CPT

## 2020-10-21 PROCEDURE — 89050 BODY FLUID CELL COUNT: CPT

## 2020-10-21 PROCEDURE — 82040 ASSAY OF SERUM ALBUMIN: CPT

## 2020-10-21 PROCEDURE — 6360000004 HC RX CONTRAST MEDICATION: Performed by: HOSPITALIST

## 2020-10-21 PROCEDURE — 82175 ASSAY OF ARSENIC: CPT

## 2020-10-21 PROCEDURE — 87102 FUNGUS ISOLATION CULTURE: CPT

## 2020-10-21 PROCEDURE — C8929 TTE W OR WO FOL WCON,DOPPLER: HCPCS

## 2020-10-21 PROCEDURE — 87205 SMEAR GRAM STAIN: CPT

## 2020-10-21 PROCEDURE — 2580000003 HC RX 258: Performed by: PSYCHIATRY & NEUROLOGY

## 2020-10-21 PROCEDURE — 86788 WEST NILE VIRUS AB IGM: CPT

## 2020-10-21 PROCEDURE — 80202 ASSAY OF VANCOMYCIN: CPT

## 2020-10-21 PROCEDURE — 86652 ENCEPHALTIS EAST EQNE ANBDY: CPT

## 2020-10-21 PROCEDURE — 70496 CT ANGIOGRAPHY HEAD: CPT

## 2020-10-21 PROCEDURE — 1210000000 HC MED SURG R&B

## 2020-10-21 PROCEDURE — 86431 RHEUMATOID FACTOR QUANT: CPT

## 2020-10-21 PROCEDURE — 86653 ENCEPHALTIS ST LOUIS ANTBODY: CPT

## 2020-10-21 PROCEDURE — 2580000003 HC RX 258: Performed by: HOSPITALIST

## 2020-10-21 PROCEDURE — 87899 AGENT NOS ASSAY W/OPTIC: CPT

## 2020-10-21 PROCEDURE — 6360000002 HC RX W HCPCS: Performed by: PSYCHIATRY & NEUROLOGY

## 2020-10-21 PROCEDURE — 83916 OLIGOCLONAL BANDS: CPT

## 2020-10-21 PROCEDURE — 82945 GLUCOSE OTHER FLUID: CPT

## 2020-10-21 PROCEDURE — A9577 INJ MULTIHANCE: HCPCS | Performed by: PSYCHIATRY & NEUROLOGY

## 2020-10-21 PROCEDURE — 88112 CYTOPATH CELL ENHANCE TECH: CPT

## 2020-10-21 PROCEDURE — 86617 LYME DISEASE ANTIBODY: CPT

## 2020-10-21 PROCEDURE — 86592 SYPHILIS TEST NON-TREP QUAL: CPT

## 2020-10-21 PROCEDURE — 70553 MRI BRAIN STEM W/O & W/DYE: CPT

## 2020-10-21 PROCEDURE — 86140 C-REACTIVE PROTEIN: CPT

## 2020-10-21 PROCEDURE — 85730 THROMBOPLASTIN TIME PARTIAL: CPT

## 2020-10-21 PROCEDURE — 81001 URINALYSIS AUTO W/SCOPE: CPT

## 2020-10-21 PROCEDURE — 87483 CNS DNA AMP PROBE TYPE 12-25: CPT

## 2020-10-21 PROCEDURE — 87075 CULTR BACTERIA EXCEPT BLOOD: CPT

## 2020-10-21 PROCEDURE — 95819 EEG AWAKE AND ASLEEP: CPT

## 2020-10-21 PROCEDURE — 85652 RBC SED RATE AUTOMATED: CPT

## 2020-10-21 PROCEDURE — 87086 URINE CULTURE/COLONY COUNT: CPT

## 2020-10-21 PROCEDURE — 6360000004 HC RX CONTRAST MEDICATION: Performed by: PSYCHIATRY & NEUROLOGY

## 2020-10-21 PROCEDURE — 95819 EEG AWAKE AND ASLEEP: CPT | Performed by: PSYCHIATRY & NEUROLOGY

## 2020-10-21 PROCEDURE — 86789 WEST NILE VIRUS ANTIBODY: CPT

## 2020-10-21 PROCEDURE — 94761 N-INVAS EAR/PLS OXIMETRY MLT: CPT

## 2020-10-21 PROCEDURE — 82784 ASSAY IGA/IGD/IGG/IGM EACH: CPT

## 2020-10-21 PROCEDURE — 82550 ASSAY OF CK (CPK): CPT

## 2020-10-21 PROCEDURE — 87389 HIV-1 AG W/HIV-1&-2 AB AG IA: CPT

## 2020-10-21 PROCEDURE — 83873 ASSAY OF CSF PROTEIN: CPT

## 2020-10-21 PROCEDURE — 83825 ASSAY OF MERCURY: CPT

## 2020-10-21 PROCEDURE — 82607 VITAMIN B-12: CPT

## 2020-10-21 PROCEDURE — 83655 ASSAY OF LEAD: CPT

## 2020-10-21 PROCEDURE — 84157 ASSAY OF PROTEIN OTHER: CPT

## 2020-10-21 PROCEDURE — 2709999900 FL LUMBAR PUNCTURE DIAG

## 2020-10-21 PROCEDURE — 36415 COLL VENOUS BLD VENIPUNCTURE: CPT

## 2020-10-21 PROCEDURE — 51701 INSERT BLADDER CATHETER: CPT

## 2020-10-21 PROCEDURE — 80048 BASIC METABOLIC PNL TOTAL CA: CPT

## 2020-10-21 PROCEDURE — 85610 PROTHROMBIN TIME: CPT

## 2020-10-21 PROCEDURE — 71045 X-RAY EXAM CHEST 1 VIEW: CPT

## 2020-10-21 PROCEDURE — 6360000002 HC RX W HCPCS: Performed by: HOSPITALIST

## 2020-10-21 PROCEDURE — 86654 ENCEPHALTIS WEST EQNE ANTBDY: CPT

## 2020-10-21 PROCEDURE — 85027 COMPLETE CBC AUTOMATED: CPT

## 2020-10-21 PROCEDURE — 99233 SBSQ HOSP IP/OBS HIGH 50: CPT | Performed by: PSYCHIATRY & NEUROLOGY

## 2020-10-21 PROCEDURE — 82042 OTHER SOURCE ALBUMIN QUAN EA: CPT

## 2020-10-21 PROCEDURE — 86038 ANTINUCLEAR ANTIBODIES: CPT

## 2020-10-21 PROCEDURE — 82164 ANGIOTENSIN I ENZYME TEST: CPT

## 2020-10-21 RX ORDER — LORAZEPAM 2 MG/ML
1 INJECTION INTRAMUSCULAR ONCE
Status: COMPLETED | OUTPATIENT
Start: 2020-10-21 | End: 2020-10-21

## 2020-10-21 RX ORDER — BUPRENORPHINE HYDROCHLORIDE AND NALOXONE HYDROCHLORIDE DIHYDRATE 8; 2 MG/1; MG/1
2.5 TABLET SUBLINGUAL DAILY
Status: DISCONTINUED | OUTPATIENT
Start: 2020-10-21 | End: 2020-10-23 | Stop reason: HOSPADM

## 2020-10-21 RX ORDER — LISINOPRIL 10 MG/1
10 TABLET ORAL DAILY
Status: ON HOLD | COMMUNITY
End: 2020-10-23 | Stop reason: SDUPTHER

## 2020-10-21 RX ADMIN — LORAZEPAM 1 MG: 2 INJECTION INTRAMUSCULAR; INTRAVENOUS at 10:13

## 2020-10-21 RX ADMIN — THIAMINE HYDROCHLORIDE 100 MG: 100 INJECTION, SOLUTION INTRAMUSCULAR; INTRAVENOUS at 09:13

## 2020-10-21 RX ADMIN — LEVETIRACETAM 500 MG: 5 INJECTION INTRAVENOUS at 04:45

## 2020-10-21 RX ADMIN — VANCOMYCIN HYDROCHLORIDE 1500 MG: 10 INJECTION, POWDER, LYOPHILIZED, FOR SOLUTION INTRAVENOUS at 05:27

## 2020-10-21 RX ADMIN — SODIUM CHLORIDE, PRESERVATIVE FREE 10 ML: 5 INJECTION INTRAVENOUS at 09:16

## 2020-10-21 RX ADMIN — SODIUM CHLORIDE: 9 INJECTION, SOLUTION INTRAVENOUS at 12:16

## 2020-10-21 RX ADMIN — VANCOMYCIN HYDROCHLORIDE 1500 MG: 10 INJECTION, POWDER, LYOPHILIZED, FOR SOLUTION INTRAVENOUS at 21:35

## 2020-10-21 RX ADMIN — PERFLUTREN 1.65 MG: 6.52 INJECTION, SUSPENSION INTRAVENOUS at 08:50

## 2020-10-21 RX ADMIN — SODIUM CHLORIDE, PRESERVATIVE FREE 2 G: 5 INJECTION INTRAVENOUS at 18:17

## 2020-10-21 RX ADMIN — ACYCLOVIR SODIUM 650 MG: 50 INJECTION, SOLUTION INTRAVENOUS at 18:17

## 2020-10-21 RX ADMIN — LEVETIRACETAM 750 MG: 500 INJECTION, SOLUTION INTRAVENOUS at 20:51

## 2020-10-21 RX ADMIN — GADOBENATE DIMEGLUMINE 20 ML: 529 INJECTION, SOLUTION INTRAVENOUS at 11:36

## 2020-10-21 RX ADMIN — VANCOMYCIN HYDROCHLORIDE 1500 MG: 10 INJECTION, POWDER, LYOPHILIZED, FOR SOLUTION INTRAVENOUS at 12:16

## 2020-10-21 RX ADMIN — SODIUM CHLORIDE, PRESERVATIVE FREE 2 G: 5 INJECTION INTRAVENOUS at 09:19

## 2020-10-21 RX ADMIN — IOPAMIDOL 90 ML: 755 INJECTION, SOLUTION INTRAVENOUS at 18:32

## 2020-10-21 RX ADMIN — BUPRENORPHINE AND NALOXONE 2.5 TABLET: 8; 2 TABLET SUBLINGUAL at 16:56

## 2020-10-21 RX ADMIN — ACYCLOVIR SODIUM 650 MG: 50 INJECTION, SOLUTION INTRAVENOUS at 09:24

## 2020-10-21 ASSESSMENT — PAIN SCALES - GENERAL: PAINLEVEL_OUTOF10: 0

## 2020-10-21 NOTE — PROGRESS NOTES
Physician Progress Note      Alis Viera  CSN #:                  [id number]  :                       1985  ADMIT DATE:       10/20/2020 3:32 PM  [address] [place] DATE:  RESPONDING  PROVIDER #:        PARVEZ HORVATH MD          QUERY TEXT:    Pt admitted with new onset seizure. Pt. noted to have HR; 102, RR: 22,   T:101.2  on arrival. If possible, please document in progress notes and   discharge summary if you are evaluating and/or treating any of the following: The medical record reflects the following:  Risk Factors: Drug Abuse, w Onset Seizures  Clinical Indicators: HR; 102, RR: 22, T:101.2  on arrival, WBC: 17.7    Treatment: Rocephin, Vancomycin,    Thank you in advance,    Franklin Biswas RN-BSN  Clinical Documentation  [address]  [email or web address]  Options provided:  -- Bacterial infection unknown source/etiology  -- Viral infection of unknown source/etiology  -- SIRS of unknown etiology with empiric antimicrobial therapy  -- Other - I will add my own diagnosis  -- Disagree - Not applicable / Not valid  -- Disagree - Clinically unable to determine / Unknown  -- Refer to Clinical Documentation Reviewer    PROVIDER RESPONSE TEXT:    This patient has SIRS with unknown etiology being treated with empiric   antimicrobial therapy.     Query created by: Yobany Zhu on 10/21/2020 2:32 PM      Electronically signed by:  Clare Garcia MD 10/21/2020 3:19 PM

## 2020-10-21 NOTE — PROGRESS NOTES
ischemic. No overt mass, abscess, or abnormal enhancement noted. Not overtly suggestive of PML but not completely excluded. Underlying infectious or inflammatory source for MRI changes is not excluded. MS felt unlikely. ADEM also felt somewhat unlikely.        PLAN:  1. Will plan LP given fever to exclude a possible underlying encephalitic source for MRI abnormalities. Will send additional inflammatory and infectious lab screenings as well. She is not on any medications that would induce PRES, question if could be elicit drug related. No history of immunocompromise, have sent HIV testing. Blood pressure is not significantly elevated, continue to maximize. 2.  ECHO with reduced ejection fraction and global hypokinesia, defer to medicine, consider cardiology if felt needed. 3.  Follow up MRA head and neck to complete vascular workup but felt lower yield given MRI results. 4.  Continue Keppra at 750 mg q12h, follow up EEG  5. Ativan prn, seizure precautions. 6.  Thiamine  7. Monitor closely with withdrawal, ativan prn.  8.  CPK slightly elevated, suspect rhabdomyolysis (mild) more so than serotonin syndrome or NMS. But will continue to hold serotonergic medications and antipsychotic medications for now. Medicine following, supplementing IVF. 9.  Continue empiric antimicrobials to cover CNS infection for now pending above. 10.  Holding lovenox for LP, scd's. Please feel free to call with any questions. [phone] (cell phone).     Wallace Francis DO  Board Certified Neurology

## 2020-10-21 NOTE — PLAN OF CARE
[place], [place], [address]        DEPARTMENT OF HOSPITALIST MEDICINE        PLAN  OF  CARE  NOTE:      I examined the patient earlier today and did a complete H&P for the patient. The nurse called me regarding a fever of 101.2. I treated patient for likely sepsis with 1000 cc normal saline bolus followed by IV fluids at 125 cc/h. Neurology and I discussed the case in detail. Patient has a history of recent dental work and drug abuse with the possibility of meningitis and infective endocarditis in our minds. Patient started on IV Rocephin, ceftriaxone and acyclovir to cover for likely pathogens. Recheck patient within 4 hours of my admitting H&P as per sepsis protocol. On examination, she was still pleasantly confused, but he shakes had resolved with IV Ativan and IV Keppra. CVS =S1+S2+0. We will continue with IV hydration. Patient CPK level was more than 1000 consistent with rhabdomyolysis. Will follow up CK level closely. She would be monitored very closely on the floor.     Mae Sellers MD  10/20/2020, 9:50 PM

## 2020-10-21 NOTE — PROCEDURES
ADULT INPATIENT ELECTROENCEPHALOGRAM REPORT    Patient:   [place] Clement  MR#:    [id number]  Room #:    INPATIENT  YOB: 1985  Date of Evaluation:  10/21/2020  Referring Physician:   Patsy Aggarwal DO      CLINICAL INFORMATION:     This patient is a 37 y.o. female with a history of seizures. MEDICATIONS:     See MAR. RECORDING CONDITIONS:     This EEG was performed utilizing standard International 10-20 System of electrode placement, with additional channels monitored for eye movement. One channel electrocardiogram was monitored. Data was obtained, stored, and interpreted according to ACNS guidelines (J Clin Neurophysiol 2006;23(2):) utilizing referential montage recording, with reformatting to longitudinal, transverse bipolar, and referential montages as necessary for interpretation, along with the digital/automated EEG analysis. Patient tolerated entire procedure well. Photic stimulation and hyperventilation were utilized as activation procedures unless otherwise specified below. E.E.G. DESCRIPTION:     The resting predominant posterior background frequency is a 9-10 Hz 30-40 uV rhythm. No overt focal, lateralizing, or paroxysmal abnormalities were noted through the recording. Drowsiness was demonstrated by a loss of the background waking activities. Onset of stage I sleep was demonstrated by gradual disappearance of background waking rhythms with gradual symmetric mixed frequency 4-7 Hz slowing. Stage II sleep was characterized by vertex transients, sleep-spindles, and K-complexes, at times with shifting asymmetry demonstrated. Hyperventilation was not performed. Photic stimulation was performed and had little change on the recording. No ECG abnormalities were noted. Muscle, motion, and eye movement artifacts were noted. EEG INTERPRETATION:    Normal EEG for age in the awake, drowsy, and sleep states.        CLINICAL CORRELATION:     The absence of epileptiform abnormalities does not preclude a clinical diagnosis of seizures.        Valeria Lepe,   Board Certified Neurologist    Date reported: 10/21/2020  Date signed: 10/21/2020

## 2020-10-22 LAB
ANION GAP SERPL CALCULATED.3IONS-SCNC: 11 MMOL/L (ref 7–19)
BUN BLDV-MCNC: 12 MG/DL (ref 6–20)
CALCIUM SERPL-MCNC: 8.6 MG/DL (ref 8.6–10)
CHLORIDE BLD-SCNC: 101 MMOL/L (ref 98–111)
CO2: 28 MMOL/L (ref 22–29)
CREAT SERPL-MCNC: 0.6 MG/DL (ref 0.5–0.9)
GFR AFRICAN AMERICAN: >59
GFR NON-AFRICAN AMERICAN: >60
GLUCOSE BLD-MCNC: 102 MG/DL (ref 74–109)
HCT VFR BLD CALC: 37.6 % (ref 37–47)
HEMOGLOBIN: 12.3 G/DL (ref 12–16)
MCH RBC QN AUTO: 29.2 PG (ref 27–31)
MCHC RBC AUTO-ENTMCNC: 32.7 G/DL (ref 33–37)
MCV RBC AUTO: 89.3 FL (ref 81–99)
PDW BLD-RTO: 14.7 % (ref 11.5–14.5)
PLATELET # BLD: 224 K/UL (ref 130–400)
PMV BLD AUTO: 10.4 FL (ref 9.4–12.3)
POTASSIUM SERPL-SCNC: 3.7 MMOL/L (ref 3.5–5)
RBC # BLD: 4.21 M/UL (ref 4.2–5.4)
RPR: NORMAL
SODIUM BLD-SCNC: 140 MMOL/L (ref 136–145)
TOTAL CK: 588 U/L (ref 26–192)
WBC # BLD: 16 K/UL (ref 4.8–10.8)

## 2020-10-22 PROCEDURE — 6370000000 HC RX 637 (ALT 250 FOR IP): Performed by: HOSPITALIST

## 2020-10-22 PROCEDURE — 94761 N-INVAS EAR/PLS OXIMETRY MLT: CPT

## 2020-10-22 PROCEDURE — 2580000003 HC RX 258: Performed by: PSYCHIATRY & NEUROLOGY

## 2020-10-22 PROCEDURE — 97116 GAIT TRAINING THERAPY: CPT

## 2020-10-22 PROCEDURE — 6360000002 HC RX W HCPCS: Performed by: PSYCHIATRY & NEUROLOGY

## 2020-10-22 PROCEDURE — 85027 COMPLETE CBC AUTOMATED: CPT

## 2020-10-22 PROCEDURE — 2700000000 HC OXYGEN THERAPY PER DAY

## 2020-10-22 PROCEDURE — 2580000003 HC RX 258: Performed by: HOSPITALIST

## 2020-10-22 PROCEDURE — 1210000000 HC MED SURG R&B

## 2020-10-22 PROCEDURE — 97161 PT EVAL LOW COMPLEX 20 MIN: CPT

## 2020-10-22 PROCEDURE — 82550 ASSAY OF CK (CPK): CPT

## 2020-10-22 PROCEDURE — 36415 COLL VENOUS BLD VENIPUNCTURE: CPT

## 2020-10-22 PROCEDURE — 80048 BASIC METABOLIC PNL TOTAL CA: CPT

## 2020-10-22 PROCEDURE — 6360000002 HC RX W HCPCS: Performed by: HOSPITALIST

## 2020-10-22 PROCEDURE — 99233 SBSQ HOSP IP/OBS HIGH 50: CPT | Performed by: PSYCHIATRY & NEUROLOGY

## 2020-10-22 RX ADMIN — FAMOTIDINE 20 MG: 20 TABLET ORAL at 08:25

## 2020-10-22 RX ADMIN — VANCOMYCIN HYDROCHLORIDE 1500 MG: 10 INJECTION, POWDER, LYOPHILIZED, FOR SOLUTION INTRAVENOUS at 05:32

## 2020-10-22 RX ADMIN — ACETAMINOPHEN 650 MG: 325 TABLET ORAL at 14:14

## 2020-10-22 RX ADMIN — BUPRENORPHINE AND NALOXONE 2.5 TABLET: 8; 2 TABLET SUBLINGUAL at 08:25

## 2020-10-22 RX ADMIN — CIPROFLOXACIN HYDROCHLORIDE 500 MG: 500 TABLET, FILM COATED ORAL at 08:25

## 2020-10-22 RX ADMIN — LEVETIRACETAM 750 MG: 500 INJECTION, SOLUTION INTRAVENOUS at 05:05

## 2020-10-22 RX ADMIN — ACYCLOVIR SODIUM 650 MG: 50 INJECTION, SOLUTION INTRAVENOUS at 02:56

## 2020-10-22 ASSESSMENT — PAIN SCALES - GENERAL
PAINLEVEL_OUTOF10: 3
PAINLEVEL_OUTOF10: 0

## 2020-10-22 NOTE — PROGRESS NOTES
The patient left the floor without notifying staff. She was found to have gone to the cafeteria with her visitor. The patient returned to the room and was educated about the need to remain on the floor for telemetry monitoring.

## 2020-10-22 NOTE — PROGRESS NOTES
[facility]        DEPARTMENT OF HOSPITALIST MEDICINE        PROGRESS  NOTE:    NOTE: Portions of this note have been copied forward, however, changed to reflect the most current clinical status of this patient. Patient:  Liset Crespo  YOB: 1985  Date of Service: 10/21/2020  MRN: [id number]   Acct: [de-identified]   Primary Care Physician: No primary care provider on file. Advance Directive: Full Code  Admit Date: 10/20/2020       Hospital Day: 1      CHIEF COMPLAINT:  No chief complaint on file. SUBJECTIVE:  Patient still pleasantly confused. She is a little bit interactive with staff but unable to carry conversation. [address]  COURSE:    10/21/2020  Patient had issues with brain MRI. I gave IV Ativan for sedation which helped her get through the MRI. 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 EEG was done which showed absence of epileptiform abnormality but did not preclude a clinical diagnosis of seizures. 10/20/2020  I examined the patient earlier today and did a complete H&P for the patient. The nurse called me regarding a fever of 101.2. I treated patient for likely sepsis with 1000 cc normal saline bolus followed by IV fluids at 125 cc/h. Neurology and I discussed the case in detail. Patient has a history of recent dental work and drug abuse with the possibility of meningitis and infective endocarditis in our minds. Patient started on IV Rocephin, ceftriaxone and acyclovir to cover for likely pathogens. Recheck patient within 4 hours of my admitting H&P as per sepsis protocol. On examination, she was still pleasantly confused, but he shakes had resolved with IV Ativan and IV Keppra. CVS =S1+S2+0.     We will continue with IV hydration.   Patient CPK level was more than 1000 consistent with rhabdomyolysis. Will follow up CK level closely. She would be monitored very closely on the floor. 10/20/2020  HISTORY OF PRESENT ILLNESS:  Ingrid Patrick is an 37 y.o. female. She is a very unfortunate lady with chronic history of illicit drug use who came to the transferring  ER with altered mental status and had winessed seizures. Patient was given IV Ativan which helped a bit. CAT scan of the head was done which showed 2 new parietal lesions as compared to prior CAT scan from 2019. Her Urine drug screen was positive for THC and amphetamines. Patient was transferred to our hospital via EMS. Enroute she had 3 more seizures. The patient came to the ER, I rushed to evaluate the patient and she was having body tremors. I ordered IV Ativan as needed, and loaded her with Keppra followed by maintenance Keppra dose and also asked for neurology evaluation. She will be admitted for medical management, cardiopulmonary monitoring, close neurologic evaluation and follow-up. REVIEW  OF  SYSTEMS:    Unable to be obtained . .. Patient is pleasantly confused! PAST MEDICAL HISTORY:  Past Medical History:   Diagnosis Date    Anxiety     Depression          PAST SURGICAL HISTORY:  Past Surgical History:   Procedure Laterality Date    CHOLECYSTECTOMY      DILATION AND CURETTAGE OF UTERUS      x2    HERNIA REPAIR      HYSTERECTOMY      TOOTH EXTRACTION  last week    TUBAL LIGATION          FAMILY HISTORY:  Family History   Problem Relation Age of Onset    Diabetes Mother     Cancer Father            OBJECTIVE:  /68   Pulse 69   Temp 98.7 °F (37.1 °C) (Oral)   Resp 18   Ht 5' 8\" (1.727 m)   Wt 253 lb (114.8 kg)   SpO2 96%   BMI 38.47 kg/m²   I/O this shift:   In: 743.2 [I.V.:215.8; IV Piggyback:527.4]  Out: -     PHYSICAL  EXAMINATION:    VANESSA:   Patient is pleasantly confused, appears tired and fatigued   Head/Eyes:  Normocephalic, atraumatic, EOMI and PERRLA bilaterally medications  Continue with IV Keppra  Continue with ciprofloxacin, vancomycin and acyclovir as per neurology recommendations  Patient is being considered for a lumbar puncture tomorrow  Keep patient n.p.o. after midnight tomorrow  Leukocytosis is still elevated, will follow  Detailed neurology evaluation and recommendations reviewed, appreciated and agreed with  Patient potassium level was low at 3  Aggressive IV potassium replacement given with 60 mg KCl IV over 6 hours  Total CK level jumped from 1058-1 319  Continue with IV hydration  Monitor CPK level daily      Repeat labs in a.m. Electrolyte replacement as per protocol. Patient will be monitored very closely on the floor. Further recommendations as per the hospital course. Patient  is on DVT prophylaxis  Current medications reviewed  Lab work reviewed  Radiology/Chest x-ray films reviewed  Treatment recommendations from suspecialities reviewed, appreciated and agreed with  Discussed with the nurse and addressed all questions/concerns      Nilesh Gallegos MD  10/21/2020 10:57 PM      DISCLAIMER: This note was created with electronic voice recognition which does have occasional errors. If you have any questions regarding the content within the note please do not hesitate to contact me. .. Thanks.

## 2020-10-22 NOTE — PROGRESS NOTES
Leia Lo has been alert, oriented, and pleasant this shift. She denies nausea, has been up independently, tolerating a general diet, and is hopeful of DC to home in the near future.

## 2020-10-22 NOTE — PROGRESS NOTES
Pharmacy Vancomycin Consult     Vancomycin Day: 3  Current Dosinmg Q8hr    Temp max:  101.2  Recent Labs     10/20/20  1649 10/21/20  0300   BUN 9 8       Recent Labs     10/20/20  1649 10/21/20  0300   CREATININE 0.6 0.5       Recent Labs     10/20/20  1649 10/21/20  0300   WBC 17.7* 19.4*         Intake/Output Summary (Last 24 hours) at 10/22/2020 0114  Last data filed at 10/21/2020 1839  Gross per 24 hour   Intake 3002.58 ml   Output 300 ml   Net 2702.58 ml       Culture Date Source Results   10/20/20 Blood No growth   10/21/20 CSF No organisms            Ht Readings from Last 1 Encounters:   10/20/20 5' 8\" (1.727 m)        Wt Readings from Last 1 Encounters:   10/20/20 253 lb (114.8 kg)         Body mass index is 38.47 kg/m². Estimated Creatinine Clearance: 213 mL/min (based on SCr of 0.5 mg/dL).     Trough: 13.6    Assessment/Plan: Level good, continue same    Electronically signed by Priscilla Ortiz [facility] on 10/22/2020 at 1:14 AM

## 2020-10-22 NOTE — PROGRESS NOTES
[facility] Neurology Progress Note      Patient:   José Antonio Fournier  MR#:    [id number]   Room:    15/515-01   YOB: 1985  Date of Progress Note: 10/22/2020  Time of Note                           9:11 AM  Consulting Physician:  Jeremy Olvera DO  Attending Physician:  Yoko Marcos MD      INTERVAL HISTORY:  Much improved overnight, sitting at bedside, no seizures, confusion / agitation resolved, no overt seizure activity noted overnight, no headache this am.     REVIEW OF SYSTEMS:  Constitutional: No acute distress  Neck:No stiffness  Respiratory: No shortness of breath  Cardiovascular: No chest pain   Gastrointestinal: No abdominal pain    Genitourinary: No Dysuria  Neurological: No headache, no confusion    PHYSICAL EXAM:    Constitutional -   /74   Pulse 64   Temp 96.8 °F (36 °C) (Temporal)   Resp 18   Ht 5' 8\" (1.727 m)   Wt 253 lb (114.8 kg)   SpO2 93%   BMI 38.47 kg/m²   General appearance: No acute distress   EYES -   Conjunctiva normal  Pupillary exam as below, see CN exam in the neurologic exam  ENT-    No scars, masses, or lesions over external nose or ears  Hearing normal bilaterally to finger rub  Neck-   No neck masses noted  Thyroid normal   No jugular vein distension  Cardiovascular -   No clubbing, cyanosis, or edema   Pulmonary-   Good expansion, normal effort without use of accessory muscles  Musculoskeletal -   No significant wasting of muscles noted  Gait as below, see gait exam in the neurologic exam  Muscle strength, tone, stability as below see the motor exam in the neurologic exam.   No bony deformities  Skin -   Warm, dry, and intact to inspection and palpation.     No rash, erythema, or pallor  Psychiatric -   Mood, affect, and behavior appears agitated  Memory as below see mental status examination in the neurologic exam    General appearance: No acute distress   EYES -   Conjunctiva normal  Pupillary exam as below, see CN exam in the neurologic exam  ENT- No scars, masses, or lesions over external nose or ears  Oropharynx without erythema, palate midline, tongue biting noted  Cardiovascular -   No clubbing, cyanosis, or edema   Pulmonary-   Good expansion, normal effort without use of accessory muscles  Musculoskeletal -   No significant wasting of muscles noted  Gait as below, see gait exam in the neurologic exam  Muscle strength, tone, stability as below see the motor exam in the neurologic exam.   No bony deformities  Skin -   Warm, dry, and intact to inspection and palpation. No rash, erythema, or pallor  Psychiatric -   Mood, affect, and behavior appears anxious   Memory as below see mental status examination in the neurologic exam        NEUROLOGICAL EXAM     Mental status    [x]? Awake, alert, oriented   [x]? Affect attention and concentration appear appropriate  [x]? Recent and remote memory appears unremarkable  []? Speech normal without dysarthria or aphasia, comprehension and repetition intact. COMMENTS:  Speech improved   Cranial Nerves []? No VF deficit to confrontation,  optic discs normal, no papilledema on fundoscopic exam.  []? PERRLA, EOMI, no nystagmus, conjugate eye movements, no ptosis  []? Face symmetric  []? Facial sensation intact  []? Tongue midline no atrophy or fasciculations present  []? Palate midline, hearing to finger rub normal  []? Shoulder shrug and SCM testing normal  COMMENTS: PERRL, EOMI, face appears sym   Motor   [x]? 5/5 strength x 4 extremities  [x]? Normal bulk and tone  [x]? No tremor present  [x]? No rigidity or bradykinesia noted  COMMENTS:   Sensory  [x]? Sensation intact to light touch, pin prick, vibration, and proprioception BLE  []? Sensation intact to light touch, pin prick, vibration, and proprioception BUE  COMMENTS:     Coordination [x]? FTN normal bilaterally   []? HTS normal bilaterally  []? JAVI normal.   COMMENTS:    Reflexes  [x]? Symmetric and non-pathological  [x]? Toes downgoing bilaterally  [x]?  No clonus present  COMMENTS:   Gait                  []? Normal steady gait    []? Ataxic    []? Spastic     []? Magnetic     []? Shuffling  [x]? Not assessed  COMMENTS:        LABS/IMAGING:    Xr Chest Portable    Result Date: 10/21/2020  EXAMINATION: XR CHEST PORTABLE 10/21/2020 7:01 AM HISTORY: XR CHEST PORTABLE 10/21/2020 3:23 AM HISTORY: Increasing temperature COMPARISON: September 7, 2018. FINDINGS: The lungs are clear. The cardiomediastinal silhouette and pulmonary vascularity are within normal limits. Cardiac monitoring leads are present. The osseous structures and surrounding soft tissues demonstrate no acute abnormality. 1. No radiographic evidence of acute cardiopulmonary process. Signed by Dr Markie Ayoub on 10/21/2020 7:01 AM      Lab Results   Component Value Date    WBC 16.0 (H) 10/22/2020    HGB 12.3 10/22/2020    HCT 37.6 10/22/2020    MCV 89.3 10/22/2020     10/22/2020     Lab Results   Component Value Date     10/22/2020    K 3.7 10/22/2020     10/22/2020    CO2 28 10/22/2020    BUN 12 10/22/2020    CREATININE 0.6 10/22/2020    GLUCOSE 102 10/22/2020    CALCIUM 8.6 10/22/2020    PROT 7.5 12/31/2014    LABALBU 4.4 12/31/2014    ALKPHOS 75 12/31/2014    AST 27 12/31/2014    ALT 33 12/31/2014    LABGLOM >60 10/22/2020    GFRAA >59 10/22/2020     Lab Results   Component Value Date    INR 1.08 10/21/2020    PROTIME 13.9 10/21/2020      EEG normal    CTA head negative. RECORD REVIEW:   Previous medical records, medications were reviewed at today's visit. Nursing/physician notes, imaging, labs and vitals reviewed. PT,OT and/or speech notes reviewed    ASSESSMENT:  37 y.o. admitted with confusion, new onset seizure activity, abnormal CT/MRI, polysubstance abuse history currently on suboxone. Exam much improved overnight, getting back to baseline, no further seizure activity noted.  MRI with scattered atypical appearing white matter abnormalities noted posteriorly, felt most likely PRES given non-inflammatory CSF and clinical improvement. Does not appear ischemic. No overt mass, abscess, or abnormal enhancement noted. Not overtly suggestive of PML. MS/ADEM felt unlikely as well given clinical improvement. Infectious or inflammatory source for MRI changes felt less likely given CSF.       PLAN:  1. Follow up remaining serum and csf labs. Will also need a follow up MRI in 1 month. She is not on any medications that would induce PRES, but question if could be elicit drug related. No history of immunocompromise or taking immunosuppressants. Blood pressure is not significantly elevated, continue to maximize. 2.  ECHO with reduced ejection fraction and global hypokinesia, defer to medicine, consider cardiology if felt needed. 3.  Continue Keppra at 750 mg bid. Would continue this at discharge. 4.  Ativan prn, seizure precautions. 5.  Thiamine  6. Monitor for withdrawal, ativan prn.  7.  CPK slightly elevated, suspect rhabdomyolysis (mild) more so than serotonin syndrome or NMS. Continue to hold serotonergic medications and antipsychotic medications for now until CPK normalizes. Medicine following, supplementing IVF. 8.  Discontinue empiric antimicrobials from a CNS infection standpoint given non-inflammatory CSF. 9.  Advance diet, resume DVT proph, possibly home soon if continues to improve. Please feel free to call with any questions. [phone] (cell phone).     Ephraim Da Silva DO  Board Certified Neurology

## 2020-10-23 VITALS
OXYGEN SATURATION: 91 % | DIASTOLIC BLOOD PRESSURE: 96 MMHG | BODY MASS INDEX: 38.34 KG/M2 | HEIGHT: 68 IN | SYSTOLIC BLOOD PRESSURE: 172 MMHG | TEMPERATURE: 97.3 F | HEART RATE: 72 BPM | WEIGHT: 253 LBS | RESPIRATION RATE: 20 BRPM

## 2020-10-23 PROBLEM — Z91.199 PERSONAL HISTORY OF NONCOMPLIANCE WITH MEDICAL TREATMENT AND REGIMEN: Status: ACTIVE | Noted: 2020-10-23

## 2020-10-23 PROBLEM — F17.219 CIGARETTE NICOTINE DEPENDENCE WITH NICOTINE-INDUCED DISORDER: Status: ACTIVE | Noted: 2020-10-23

## 2020-10-23 PROBLEM — E87.6 HYPOKALEMIA: Status: RESOLVED | Noted: 2020-10-21 | Resolved: 2020-10-23

## 2020-10-23 PROBLEM — Z71.6 TOBACCO ABUSE COUNSELING: Status: ACTIVE | Noted: 2020-10-23

## 2020-10-23 LAB
ANION GAP SERPL CALCULATED.3IONS-SCNC: 12 MMOL/L (ref 7–19)
BUN BLDV-MCNC: 11 MG/DL (ref 6–20)
CALCIUM SERPL-MCNC: 8.9 MG/DL (ref 8.6–10)
CHLORIDE BLD-SCNC: 103 MMOL/L (ref 98–111)
CO2: 27 MMOL/L (ref 22–29)
CREAT SERPL-MCNC: 0.6 MG/DL (ref 0.5–0.9)
GFR AFRICAN AMERICAN: >59
GFR NON-AFRICAN AMERICAN: >60
GLUCOSE BLD-MCNC: 92 MG/DL (ref 74–109)
HCT VFR BLD CALC: 38.4 % (ref 37–47)
HEMOGLOBIN: 12.6 G/DL (ref 12–16)
MCH RBC QN AUTO: 29.7 PG (ref 27–31)
MCHC RBC AUTO-ENTMCNC: 32.8 G/DL (ref 33–37)
MCV RBC AUTO: 90.6 FL (ref 81–99)
PDW BLD-RTO: 14.5 % (ref 11.5–14.5)
PLATELET # BLD: 236 K/UL (ref 130–400)
PMV BLD AUTO: 10.4 FL (ref 9.4–12.3)
POTASSIUM SERPL-SCNC: 3.3 MMOL/L (ref 3.5–5)
RBC # BLD: 4.24 M/UL (ref 4.2–5.4)
SODIUM BLD-SCNC: 142 MMOL/L (ref 136–145)
TOTAL CK: 838 U/L (ref 26–192)
URINE CULTURE, ROUTINE: NORMAL
WBC # BLD: 10.6 K/UL (ref 4.8–10.8)

## 2020-10-23 PROCEDURE — 80048 BASIC METABOLIC PNL TOTAL CA: CPT

## 2020-10-23 PROCEDURE — 6360000002 HC RX W HCPCS: Performed by: HOSPITALIST

## 2020-10-23 PROCEDURE — 2700000000 HC OXYGEN THERAPY PER DAY

## 2020-10-23 PROCEDURE — 82550 ASSAY OF CK (CPK): CPT

## 2020-10-23 PROCEDURE — 6370000000 HC RX 637 (ALT 250 FOR IP): Performed by: HOSPITALIST

## 2020-10-23 PROCEDURE — 6370000000 HC RX 637 (ALT 250 FOR IP): Performed by: PSYCHIATRY & NEUROLOGY

## 2020-10-23 PROCEDURE — 99232 SBSQ HOSP IP/OBS MODERATE 35: CPT | Performed by: PSYCHIATRY & NEUROLOGY

## 2020-10-23 PROCEDURE — 85027 COMPLETE CBC AUTOMATED: CPT

## 2020-10-23 PROCEDURE — 36415 COLL VENOUS BLD VENIPUNCTURE: CPT

## 2020-10-23 RX ORDER — POTASSIUM CHLORIDE 20 MEQ/1
20 TABLET, EXTENDED RELEASE ORAL DAILY
Qty: 30 TABLET | Refills: 0 | Status: SHIPPED | OUTPATIENT
Start: 2020-10-23 | End: 2021-02-22

## 2020-10-23 RX ORDER — LISINOPRIL 10 MG/1
10 TABLET ORAL DAILY
Qty: 30 TABLET | Refills: 0 | Status: SHIPPED | OUTPATIENT
Start: 2020-10-23

## 2020-10-23 RX ORDER — POTASSIUM CHLORIDE 7.45 MG/ML
10 INJECTION INTRAVENOUS
Status: DISCONTINUED | OUTPATIENT
Start: 2020-10-23 | End: 2020-10-23 | Stop reason: ALTCHOICE

## 2020-10-23 RX ORDER — LEVETIRACETAM 750 MG/1
750 TABLET ORAL 2 TIMES DAILY
Qty: 60 TABLET | Refills: 0 | Status: SHIPPED | OUTPATIENT
Start: 2020-10-23 | End: 2020-11-04 | Stop reason: SDUPTHER

## 2020-10-23 RX ADMIN — FAMOTIDINE 20 MG: 20 TABLET ORAL at 00:31

## 2020-10-23 RX ADMIN — LEVETIRACETAM 750 MG: 500 TABLET ORAL at 00:31

## 2020-10-23 RX ADMIN — BUPRENORPHINE AND NALOXONE 2.5 TABLET: 8; 2 TABLET SUBLINGUAL at 07:19

## 2020-10-23 RX ADMIN — FAMOTIDINE 20 MG: 20 TABLET ORAL at 07:18

## 2020-10-23 RX ADMIN — LEVETIRACETAM 750 MG: 500 TABLET ORAL at 07:19

## 2020-10-23 RX ADMIN — CIPROFLOXACIN HYDROCHLORIDE 500 MG: 500 TABLET, FILM COATED ORAL at 07:20

## 2020-10-23 RX ADMIN — CIPROFLOXACIN HYDROCHLORIDE 500 MG: 500 TABLET, FILM COATED ORAL at 00:31

## 2020-10-23 RX ADMIN — POTASSIUM CHLORIDE 40 MEQ: 1500 TABLET, EXTENDED RELEASE ORAL at 11:40

## 2020-10-23 RX ADMIN — ACETAMINOPHEN 650 MG: 325 TABLET ORAL at 11:40

## 2020-10-23 ASSESSMENT — PAIN SCALES - GENERAL
PAINLEVEL_OUTOF10: 3
PAINLEVEL_OUTOF10: 5

## 2020-10-23 NOTE — DISCHARGE SUMMARY
[place] [address]        DEPARTMENT OF HOSPITALIST MEDICINE        DISCHARGE SUMMARY:      PATIENT NAME:  Rafaela Perez  :  1985  MRN:  [id number]    Admission Date:   10/20/2020  3:32 PM Attending: Candice Melo MD   Discharge Date:   10/23/2020              PCP: No primary care provider on file. Length of Stay: 3 days     Chief Complaint on Admission: No chief complaint on file. Consultants:     IP CONSULT TO NEUROLOGY  IP CONSULT TO NEUROLOGY  PHARMACY TO DOSE VANCOMYCIN  IP CONSULT TO CASE MANAGEMENT       Discharge Problem List:   Principal Problem:    New onset seizure ([address]  Active Problems:    Illicit drug use    Obesity due to excess calories    Coarse tremor    Amphetamine abuse ([facility] [address]    Marijuana use    Abnormal CT scan, head    Personal history of noncompliance with medical treatment and regimen    Cigarette nicotine dependence with nicotine-induced disorder    Tobacco abuse counseling  Resolved Problems:    Hypokalemia       Tobacco abuse counseling  Patient smokes cigarettes on a chronic basis. Strictly advised patient to cut down on or quit smoking. Nicotine patch offered. ~5 minutes spent on tobacco cessation counseling with the patient. Websites - http://smokefree. gov & [employer]   °Quitlines - 1-800-QUIT-NOW (4-367-945-887-149-8380)   °[employer] Apps - QuitSTART Yann   °Text Messages - SmokefreeTXT (send the word QUIT to [id number])       Personal history of noncompliance with medical treatment and regimen  STRICTLY advised patient to be compliant with meds and medical recommendations  Advised patient to get established with a PCP upon discharge, take care of meds, diet and bring patient's self and life back on track    [address] [facility]:    10/23/2020  Patient is feeling much better today. She is tolerating her diet and back to her baseline. She is cleared by neurology for discharge.   Please see the neurology recommendations from earlier this morning as below: Her CPK level has dropped down from 1319 to 838. Neurology recommending to hold off on Paxil until CPK level is normalized. Her leukocytosis has resolved. I have listed her Paxil in the med rec sheet, but as per neurology recommendations, I would request the patient to hold off on Paxil until she goes and follow-up with her PCP and repeat CPK level; once it is within normal limits,  then she can resume her Paxil after clearance by PCP. Doses and instructions of Suboxone as per patient's Suboxone scribing physician. Strictly advised patient to quit illicit drug use. Potassium was replaced aggressively with IV and p.o. potassium supplementation during stay in the hospital.  I will request PCP to follow-up on her electrolytes along with CPK within this week. I will prescribe her 20 mEq KCl on a daily basis          10/22/2020  Patient had significant improvement overnight. She is not having any seizures, WBC count is slowly improving with downward trending. She is ambulating well, and requesting to eat and tolerated regular diet. Nephrology consult LP and planning to discharge patient over the next couple of days if she remains stable.     10/21/2020  Patient had issues with brain MRI. I gave IV Ativan for sedation which helped her get through the MRI. 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 EEG was done which showed absence of epileptiform abnormality but did not preclude a clinical diagnosis of seizures.     10/20/2020  I examined the patient earlier today and did a complete H&P for the patient.  The nurse called me regarding a fever of 101. 2.  I treated patient for likely sepsis with 1000 cc normal saline bolus followed by IV fluids at 125 cc/h.  Neurology and I discussed the case in detail.  Patient has a history of recent dental work and drug abuse with the possibility of meningitis and infective endocarditis in our minds. Patient started on IV Rocephin, ceftriaxone and acyclovir to cover for likely pathogens.  Recheck patient within 4 hours of my admitting H&P as per sepsis protocol.  On examination, she was still pleasantly confused, but he shakes had resolved with IV Ativan and IV Keppra.  CVS =S1+S2+0.     We will continue with IV hydration.  Patient CPK level was more than 1000 consistent with rhabdomyolysis.  Will follow up CK level closely.  She would be monitored very closely on the floor.     10/20/2020  HISTORY OF PRESENT ILLNESS:  Cass Ricks an 37 y. o. female.  She is a very unfortunate lady with chronic history of illicit drug use who came to the transferring  ER with altered mental status and had winessed seizures.  Patient was given IV Ativan which helped a bit.  CAT scan of the head was done which showed 2 new parietal lesions as compared to prior CAT scan from 2019.  Her Urine drug screen was positive for THC and amphetamines. Patient was transferred to our hospital via EMS.  Enroute she had 3 more seizures.  The patient came to the ER, I rushed to evaluate the patient and she was having body tremors.  I ordered IV Ativan as needed, and loaded her with Keppra followed by maintenance Keppra dose and also asked for neurology evaluation.  She will be admitted for medical management, cardiopulmonary monitoring, close neurologic evaluation and follow-up.         OBJECTIVE:  BP (!) 172/96   Pulse 72   Temp 97.3 °F (36.3 °C) (Temporal)   Resp 20   Ht 5' 8\" (1.727 m)   Wt 253 lb (114.8 kg)   SpO2 91%   BMI 38.47 kg/m²       Heart: RRR   Lungs: Clear   Abdomen: Soft, non-tender   Extremities: No edema   Neurologic: Alert and oriented   Skin: Warm and dry          Laboratory Data:  Recent Labs     10/21/20  0300 10/22/20  0252 10/23/20  0324   WBC 19.4* 16.0* 10.6   HGB 13.1 12.3 12.6    224 236     Recent Labs     10/21/20  0300 10/22/20  0252 10/23/20  0324    140 142   K 3.0* 3.7 3.3*    101 103   CO2 21* 28 27   BUN 8 12 11   CREATININE 0.5 0.6 0.6   GLUCOSE 125* 102 92     No results for input(s): AST, ALT, ALB, BILITOT, ALKPHOS in the last 72 hours. Troponin T: No results for input(s): TROPONINI in the last 72 hours. Pro-BNP: No results for input(s): BNP in the last 72 hours. INR:   Recent Labs     10/21/20  1318   INR 1.08     UA:  Recent Labs     10/21/20  1230   COLORU YELLOW   PHUR 5.5   WBCUA 2   RBCUA 2   BACTERIA NEGATIVE*   CLARITYU Clear   SPECGRAV 1.018   LEUKOCYTESUR Negative   UROBILINOGEN 0.2   BILIRUBINUR Negative   BLOODU TRACE*   GLUCOSEU Negative     A1C: No results for input(s): LABA1C in the last 72 hours. ABG:No results for input(s): PHART, MGG9QUT, PO2ART, KZG1QSK, BEART, HGBAE, T3EQYVLP, CARBOXHGBART in the last 72 hours. Impressions of imaging performed in 48 hours before discharge:    Cta Head W Wo Contrast    Result Date: 10/21/2020  1. No large vessel occlusion or focal high-grade stenosis of the intracranial cerebral arteries. No aneurysm or dissection. No abnormal intracranial enhancement 2. Similar scattered hypodensities of the frontal, parietal, and occipital regions in a similar distribution to today's MRI brain exam favorable for posterior reversible encephalopathy syndrome/PRES.  Signed by Dr Martell Luong on 10/21/2020 8:44 PM        Erna Silva   Home Medication Instructions SNS:[id number]    Printed on:10/23/20 1706   Medication Information                      cetirizine (ZYRTEC ALLERGY) 10 MG tablet  Take 1 tablet by mouth daily             levETIRAcetam (KEPPRA) 750 MG tablet  Take 1 tablet by mouth 2 times daily             lisinopril (PRINIVIL;ZESTRIL) 10 MG tablet  Take 1 tablet by mouth daily             PARoxetine (PAXIL) 40 MG tablet  Take 40 mg by mouth every morning             potassium chloride (KLOR-CON M) 20 MEQ extended release tablet  Take 1 tablet by mouth daily                   ISSUES TO BE ADDRESSED AT HOSPITAL FOLLOW-UP VISIT:    Advised patient to follow-up closely with PCP upon discharge for management of chronic medical issues  Please see the detailed discharge directions delivered from earlier today! Condition on Discharge: gradually improving  Discharge Disposition: Home    Recommended Follow Up:  PCP    In 1 month   Follow up remaining serum and csf labs. Will also need a follow up MRI in 1 month    Followup Appointments Scheduled at Time of Discharge:  No future appointments. Discharge Instructions:   Please see the discharge paperwork. Patient was seen at bedside today, and the examination shows improvement since yesterday. Detailed discharge directions delivered to the patient by myself and our nursing staff, who verbalizes understanding and is very happy and satisfied with the plan. Patient has been advised to continue all medications as prescribed and advised, and f/u with PCP within 1 week. Patient is stable from medical standpoint to be discharged. Total time spent during patient evaluation and assessment, discussion with the nurse/family, addressing discharge medications/scripts and coordination of care for safe discharge was in excess of 50 minutes.       Signed Electronically:    Olman Saez MD  5:02 PM 10/23/2020

## 2020-10-23 NOTE — ADT AUTH CERT
Estrellita Severance (10/22/2020) by Rhona Naranjo RN         Review Status  Review Entered    In Primary  10/23/2020 09:41        Criteria Review    REVIEW SUMMARY     Patient: Varinder Dietrich  Review Number: [id number]  Review Status: In Primary     Condition Specific: Yes        OUTCOMES  Outcome Type: Primary           REVIEW DETAILS     Service Date: 10/22/2020  Product: [facility] Adult  Subset: Epilepsy      (Symptom or finding within 24h)         (Excludes PO medications unless noted)          [X] Select Day, One:              [X] Episode Day 3, One:                  [X] ACUTE, One:                      [X] Partial responder, not clinically stable for discharge and requires continued stay, >= One:                          [X] Seizure evaluation pending, inconclusive, or non-diagnostic and, All:                              [X] Anticonvulsant (includes PO)                              [X] Continuous or video electroencephalogram (EEG)                              [X] Neurological assessment at least 3x/24h     Version: [employer] 2019.1  Kristen Wheeler  © 2019 [employer] and/or one of its [place]. All Rights Reserved. CPT only © 2018 American Medical Association. All Rights Reserved. Additional Notes    CARE DAY 3         10/22/2020       LABS:    TOTAL        CSF Culture No Growth so far.  Hold 7 days.      Gram Stain Result Rare WBC's (Polymorphonuclear)    No organisms seen      Anaerobic Culture No anaerobes to date,will hold 7 days    RPR  Non-reactive       Protein, CSF  24      Glucose, CSF  90High     Color, CSF Colorless      Appearance, CSF Clear      Tube Number + CELL CT + DIFF-CSF Tube 4      Clot Evaluation CSF see below      WBC, CSF 4 /cumm      RBC,  /cumm      No differential CSF see below       CRP 5.20          ADULT INPATIENT ELECTROENCEPHALOGRAM REPORT     EEG INTERPRETATION:         Normal EEG for age in the awake, drowsy, and sleep states.         CLINICAL CORRELATION:    The absence of epileptiform abnormalities does not preclude a clinical diagnosis of seizures.         Nivia Pimentel DO    Board Certified Neurologist       FL LUMBAR PUNCTURE    Opening pressure measured approximately 31 cm of water. A total of 10 cc clear colorless CSF was obtained and sent for    analysis. The stylet was reinserted and the needle was removed. The patient tolerated the procedure well and there were no immediate    postprocedure complications. CONCLUSION:    1.  Successful fluoroscopic guided lumbar puncture. 2.  Elevated opening pressure (31 cm of water). 3.  10 mL clear colorless CSF collected and submitted for laboratory analysis.           [facility] Neurology Progress Note    INTERVAL HISTORY:  Much improved overnight, sitting at bedside, no seizures, confusion / agitation resolved, no overt seizure activity noted overnight, no headache this am.         REVIEW OF SYSTEMS:    Constitutional: No acute distress    Neck:No stiffness    Respiratory: No shortness of breath    Cardiovascular: No chest pain    Gastrointestinal: No abdominal pain      Genitourinary: No Dysuria    Neurological: No headache, no confusion         PHYSICAL EXAM:         Constitutional -    /74   Pulse 64   Temp 96.8 °F (36 °C) (Temporal)   Resp 18   Ht 5' 8\" (1.727 m)   Wt 253 lb (114.8 kg)   SpO2 93%   BMI 38.47 kg/m²    General appearance: No acute distress    EYES -    Conjunctiva normal    Pupillary exam as below, see CN exam in the neurologic exam    ENT-      No scars, masses, or lesions over external nose or ears    Hearing normal bilaterally to finger rub    Neck-    No neck masses noted    Thyroid normal    No jugular vein distension    Cardiovascular -    No clubbing, cyanosis, or edema    Pulmonary-    Good expansion, normal effort without use of accessory muscles    Musculoskeletal -    No significant wasting of muscles noted    Gait as below, see gait exam in the neurologic exam    Muscle strength, tone, stability as below see the motor exam in the neurologic exam.    No bony deformities    Skin -    Warm, dry, and intact to inspection and palpation.      No rash, erythema, or pallor    Psychiatric -    Mood, affect, and behavior appears agitated    Memory as below see mental status examination in the neurologic exam         General appearance: No acute distress    EYES -    Conjunctiva normal    Pupillary exam as below, see CN exam in the neurologic exam    ENT-      No scars, masses, or lesions over external nose or ears    Oropharynx without erythema, palate midline, tongue biting noted    Cardiovascular -     No clubbing, cyanosis, or edema    Pulmonary-     Good expansion, normal effort without use of accessory muscles    Musculoskeletal -     No significant wasting of muscles noted    Gait as below, see gait exam in the neurologic exam    Muscle strength, tone, stability as below see the motor exam in the neurologic exam.    No bony deformities    Skin -     Warm, dry, and intact to inspection and palpation.      No rash, erythema, or pallor    Psychiatric -     Mood, affect, and behavior appears anxious     Memory as below see mental status examination in the neurologic exam         NEUROLOGICAL EXAM       Speech improved    : PERRL, EOMI, face appears sym       WBC 16    HGB 12.3            K 3.7        CO2 28    BUN 12    CREAT 0.6       EEG normal         CTA head negative. ASSESSMENT:    37 y.o. admitted with confusion, new onset seizure activity, abnormal CT/MRI, polysubstance abuse history currently on suboxone. Exam much improved overnight, getting back to baseline, no further seizure activity noted. MRI with scattered atypical appearing white matter abnormalities noted posteriorly, felt most likely PRES given non-inflammatory CSF and clinical improvement.  Does not appear ischemic.  No overt mass, abscess, or abnormal Ativan for sedation which helped her get through the MRI.  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  EEG was done which showed absence of epileptiform abnormality but did not preclude a clinical diagnosis of seizures.         10/20/2020    I examined the patient earlier today and did a complete H&P for the patient.  The nurse called me regarding a fever of 101. 2.  I treated patient for likely sepsis with 1000 cc normal saline bolus followed by IV fluids at 125 cc/h.  Neurology and I discussed the case in detail. Carson Ceballos has a history of recent dental work and drug abuse with the possibility of meningitis and infective endocarditis in our minds.  Patient started on IV Rocephin, ceftriaxone and acyclovir to cover for likely pathogens.  Recheck patient within 4 hours of my admitting H&P as per sepsis protocol.  On examination, she was still pleasantly confused, but he shakes had resolved with IV Ativan and IV Keppra.  CVS =S1+S2+0.         We will continue with IV hydration.  Patient CPK level was more than 1000 consistent with rhabdomyolysis.  Will follow up CK level closely.  She would be monitored very closely on the floor.         OBJECTIVE:    /75   Pulse 73   Temp 97.4 °F (36.3 °C) (Temporal)   Resp 16   Ht 5' 8\" (1.727 m)   Wt 253 lb (114.8 kg)   SpO2 91%   BMI 38.47 kg/m²       Scheduled Medications    · levETIRAcetam 750 mg Oral BID    · buprenorphine-naloxone 2.5 tablet Sublingual Daily    · cetirizine 10 mg Oral Daily    · ciprofloxacin 500 mg Oral BID    · sodium chloride flush 10 mL Intravenous 2 times per day    · famotidine 20 mg Oral BID    · thiamine 100 mg Intravenous Daily          Infusions Meds    · sodium chloride Stopped (10/22/20 0941)               ASSESSMENT & PLAN:         Principal Problem:      New onset seizure Epilepsy      (Symptom or finding within 24h)         (Excludes PO medications unless noted)          [X] Select Day, One:              [X] Episode Day 2, One:                  [X] ACUTE, One:                      [X] Partial responder, not clinically stable for discharge and requires continued stay, >= One:                          [X] Seizure evaluation pending, inconclusive, or non-diagnostic and, All:                              [X] Anticonvulsant (includes PO)                              [X] Continuous or video electroencephalogram (EEG)                              [X] Neurological assessment at least 3x/24h     Version: [employer] 2019.1  Beba Wong  © 2019 [employer] and/or one of its [place]. All Rights Reserved. CPT only © 2018 American Medical Association. All Rights Reserved.    Additional Notes    CARE   DAY 2      10/21/2020       FL LUMBAR PUNCTURE DIAG  1 TIME IMAGING      Question: Reason for exam: Answer: Abnormal MRI, r/o encephalitis    CTA HEAD W WO CONTRAST  1 TIME IMAGING      Question: Reason for exam: Answer: [facility] Neurology Progress Note           INTERVAL HISTORY:  Doing about the same this am, no overt seizure activity noted overnight, agitated, some confusion noted.         REVIEW OF SYSTEMS:    Constitutional: Low grade fever    Neck:No stiffness    Respiratory: No shortness of breath    Cardiovascular: No chest pain    Gastrointestinal: No abdominal pain      Genitourinary: No Dysuria    Neurological: No headache         PHYSICAL EXAM:         Constitutional -    /68   Pulse 91   Temp 99.4 °F (37.4 °C) (Oral)   Resp 22   Ht 5' 8\" (1.727 m)   Wt 253 lb (114.8 kg)   SpO2 90%   BMI 38.47 kg/m²    General appearance: No acute distress    EYES -    Conjunctiva normal    Pupillary exam as below, see CN exam in the neurologic exam    ENT-      No scars, masses, or lesions over external nose or ears    Hearing normal bilaterally to finger rub    Neck-    No neck masses noted    Thyroid normal    No jugular vein distension    Cardiovascular -    No clubbing, cyanosis, or edema    Pulmonary-    Good expansion, normal effort without use of accessory muscles    Musculoskeletal -    No significant wasting of muscles noted    Gait as below, see gait exam in the neurologic exam    Muscle strength, tone, stability as below see the motor exam in the neurologic exam.    No bony deformities    Skin -    Warm, dry, and intact to inspection and palpation.      No rash, erythema, or pallor    Psychiatric -    Mood, affect, and behavior appears agitated    Memory as below see mental status examination in the neurologic exam         General appearance: No acute distress    EYES -    Conjunctiva normal    Pupillary exam as below, see CN exam in the neurologic exam    ENT-      No scars, masses, or lesions over external nose or ears    Oropharynx without erythema, palate midline, tongue biting noted    Cardiovascular -     No clubbing, cyanosis, or edema    Pulmonary-     Good expansion, normal effort without use of accessory muscles    Musculoskeletal -     No significant wasting of muscles noted    Gait as below, see gait exam in the neurologic exam    Muscle strength, tone, stability as below see the motor exam in the neurologic exam.    No bony deformities    Skin -     Warm, dry, and intact to inspection and palpation.      No rash, erythema, or pallor    Psychiatric -     Mood, affect, and behavior appears anxious     Memory as below see mental status examination in the neurologic exam       NEUROLOGICAL EXAM    Arousal easily, following commands, speech is slurred, mildly confused     PERRL, EOMI, face appears sym       Xr Chest Portable     . No radiographic evidence of acute cardiopulmonary process.  Signed by Dr Isabelle Serrato on 10/21/2020 7:01 AM       WBC 19.4    HGB 13.1            K 3.0        CO2 21    BUN 8  CPK slightly elevated, suspect rhabdomyolysis (mild) more so than serotonin syndrome or NMS.  But will continue to hold serotonergic medications and antipsychotic medications for now.  Medicine following, supplementing IVF. 9.  Continue empiric antimicrobials to cover CNS infection for now pending above. 10.  Holding lovenox for LP, scd's.

## 2020-10-23 NOTE — PROGRESS NOTES
[place], [place], [address]        DEPARTMENT OF HOSPITALIST MEDICINE        PROGRESS  NOTE:    NOTE: Portions of this note have been copied forward, however, changed to reflect the most current clinical status of this patient. Patient:  Keaton Baird  YOB: 1985  Date of Service: 10/22/2020  MRN: [id number]   Acct: [de-identified]   Primary Care Physician: No primary care provider on file. Advance Directive: Full Code  Admit Date: 10/20/2020       Hospital Day: 2      CHIEF COMPLAINT:  Patient transferred to our facility for neurology evaluation after new onset seizures    SUBJECTIVE:  Patient has dramatically improved since yesterday. She is ambulating well, communicating with staff, laughing and carrying out conversation. [address]  COURSE:    10/22/2020  Patient had significant improvement overnight. She is not having any seizures, WBC count is slowly improving with downward trending. She is ambulating well, and requesting to eat and tolerated regular diet. Nephrology consult LP and planning to discharge patient over the next couple of days if she remains stable. 10/21/2020  Patient had issues with brain MRI. I gave IV Ativan for sedation which helped her get through the MRI. 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 EEG was done which showed absence of epileptiform abnormality but did not preclude a clinical diagnosis of seizures. 10/20/2020  I examined the patient earlier today and did a complete H&P for the patient. The nurse called me regarding a fever of 101.2. I treated patient for likely sepsis with 1000 cc normal saline bolus followed by IV fluids at 125 cc/h. Neurology and I discussed the case in detail.   Patient has a history of recent dental work and drug abuse with the possibility of HYSTERECTOMY      TOOTH EXTRACTION  last week    TUBAL LIGATION          FAMILY HISTORY:  Family History   Problem Relation Age of Onset    Diabetes Mother     Cancer Father            OBJECTIVE:  /75   Pulse 73   Temp 97.4 °F (36.3 °C) (Temporal)   Resp 16   Ht 5' 8\" (1.727 m)   Wt 253 lb (114.8 kg)   SpO2 91%   BMI 38.47 kg/m²   No intake/output data recorded.     PHYSICAL  EXAMINATION:    VANESSA:   Patient is alert awake oriented x3, appears tired and fatigued   Head/Eyes:  Normocephalic, atraumatic, EOMI and PERRLA bilaterally   Respiratory:   Bilateral fair air entry in both lung fields, mild B/L crackles, symmetric expansion of chest   Cardiovascular:  Regular rate and rhythm, S1+S2+0, no murmurs/rubs   Abdomen:   Soft, non-tender, bowel sounds +ve, no organomegaly   Extremities: Moves all, decreased range of motion, no edema   Neurologic:  Cranial nerves II through XII intact, motor and sensory system intact, no focal neurological deficits   Psychiatric:  Normal mood, no suicidal ideation       CURRENT MEDICATIONS:  Scheduled:   levETIRAcetam  750 mg Oral BID    buprenorphine-naloxone  2.5 tablet Sublingual Daily    cetirizine  10 mg Oral Daily    ciprofloxacin  500 mg Oral BID    sodium chloride flush  10 mL Intravenous 2 times per day    famotidine  20 mg Oral BID    thiamine  100 mg Intravenous Daily        PRN:  sodium chloride flush, 10 mL, PRN  potassium chloride, 40 mEq, PRN    Or  potassium alternative oral replacement, 40 mEq, PRN    Or  potassium chloride, 10 mEq, PRN  magnesium sulfate, 1 g, PRN  acetaminophen, 650 mg, Q6H PRN    Or  acetaminophen, 650 mg, Q6H PRN  polyethylene glycol, 17 g, Daily PRN  promethazine, 12.5 mg, Q6H PRN    Or  ondansetron, 4 mg, Q6H PRN  LORazepam, 1 mg, PRN        Infusions:   sodium chloride Stopped (10/22/20 0941)       Laboratory Data:  Recent Labs     10/20/20  1649 10/21/20  0300 10/22/20  0252   WBC 17.7* 19.4* 16.0*   HGB 13.9 13.1 12.3  266 224     Recent Labs     10/20/20  1649 10/21/20  0300 10/22/20  0252    137 140   K 3.0* 3.0* 3.7   CL 98 101 101   CO2 23 21* 28   BUN 9 8 12   CREATININE 0.6 0.5 0.6   GLUCOSE 125* 125* 102     No results for input(s): AST, ALT, ALB, BILITOT, ALKPHOS in the last 72 hours. Troponin T: No results for input(s): TROPONINI in the last 72 hours. Pro-BNP: No results for input(s): BNP in the last 72 hours. INR:   Recent Labs     10/21/20  1318   INR 1.08     UA:  Recent Labs     10/21/20  1230   COLORU YELLOW   PHUR 5.5   WBCUA 2   RBCUA 2   BACTERIA NEGATIVE*   CLARITYU Clear   SPECGRAV 1.018   LEUKOCYTESUR Negative   UROBILINOGEN 0.2   BILIRUBINUR Negative   BLOODU TRACE*   GLUCOSEU Negative     A1C: No results for input(s): LABA1C in the last 72 hours. ABG:No results for input(s): PHART, IFH7NHP, PO2ART, LOI8OHN, BEART, HGBAE, E9BZVGQV, CARBOXHGBART in the last 72 hours. Imaging:    Cta Head W Wo Contrast    Result Date: 10/21/2020  1. No large vessel occlusion or focal high-grade stenosis of the intracranial cerebral arteries. No aneurysm or dissection. No abnormal intracranial enhancement 2. Similar scattered hypodensities of the frontal, parietal, and occipital regions in a similar distribution to today's MRI brain exam favorable for posterior reversible encephalopathy syndrome/PRES. Signed by Dr Wild Bunch on 10/21/2020 8:44 PM    Xr Chest Portable    Result Date: 10/21/2020  1. No radiographic evidence of acute cardiopulmonary process. Signed by Dr Dodie Silva on 10/21/2020 7:01 AM    Mri Brain W Wo Contrast    Result Date: 10/21/2020  1. No evidence of acute infarct. 2.  Multifocal asymmetric supratentorial periventricular and subcortical white matter lesions predominantly in the parieto-occipital region. These lesions do not exhibit significant mass effect or have enhancement. Lead differential is posterior reversal encephalopathy syndrome (PRES).  Signed by Dr Nelson Jimenez on 10/21/2020 12:00 PM       ASSESSMENT & PLAN:    Principal Problem:    New onset seizure ([address]  Active Problems:    Illicit drug use    Obesity due to excess calories    Coarse tremor    Amphetamine abuse (HCC)    Marijuana use    Abnormal CT scan, head    Hypokalemia  Resolved Problems:    * No resolved hospital problems. *        Continue with current medications  Patient switched from IV to p.o. Keppra  Continue with ciprofloxacin, vancomycin and acyclovir as per neurology recommendations  DC lumbar puncture as patient's has symptomatically improved significantly  DC n.p.o., patient started on regular diet which she is tolerating well  Leukocytosis is still elevated but showing a downward trending  Detailed neurology evaluation and recommendations reviewed, appreciated and agreed with  Potassium level is back to normal after aggressive IV potassium replacement  CPK level came down from 1319-588  Continue with IV hydration  Monitor CPK level daily  PT/OT evaluation ordered      Repeat labs in a.m. Electrolyte replacement as per protocol. Patient will be monitored very closely on the floor. Further recommendations as per the hospital course. Discharge planning: Planning in the next 24 to 48 hours if she remains stable and cleared by neurology    Patient  is on DVT prophylaxis  Current medications reviewed  Lab work reviewed  Radiology/Chest x-ray films reviewed  Treatment recommendations from suspecialities reviewed, appreciated and agreed with  Discussed with the nurse and addressed all questions/concerns      Nilesh Gallegos MD  10/22/2020 8:59 PM      DISCLAIMER: This note was created with electronic voice recognition which does have occasional errors. If you have any questions regarding the content within the note please do not hesitate to contact me. .. Thanks.

## 2020-10-23 NOTE — PROGRESS NOTES
[facility] Neurology Progress Note      Patient:   Zack Lim  MR#:    [id number]   Room:    0515/515-01   YOB: 1985  Date of Progress Note: 10/23/2020  Time of Note                           9:50 AM  Consulting Physician:  Jeimy Steinberg DO  Attending Physician:  Burnice Holter, MD      INTERVAL HISTORY:  Remains much improved overnight, no seizures, confusion / agitation resolved, no headache this am.     REVIEW OF SYSTEMS:  Constitutional: No acute distress  Neck:No stiffness  Respiratory: No shortness of breath  Cardiovascular: No chest pain   Gastrointestinal: No abdominal pain    Genitourinary: No Dysuria  Neurological: No headache, no confusion    PHYSICAL EXAM:    Constitutional -   BP (!) 141/77   Pulse 67   Temp 97.6 °F (36.4 °C) (Temporal)   Resp 18   Ht 5' 8\" (1.727 m)   Wt 253 lb (114.8 kg)   SpO2 90%   BMI 38.47 kg/m²   General appearance: No acute distress   EYES -   Conjunctiva normal  Pupillary exam as below, see CN exam in the neurologic exam  ENT-    No scars, masses, or lesions over external nose or ears  Hearing normal bilaterally to finger rub  Neck-   No neck masses noted  Thyroid normal   No jugular vein distension  Cardiovascular -   No clubbing, cyanosis, or edema   Pulmonary-   Good expansion, normal effort without use of accessory muscles  Musculoskeletal -   No significant wasting of muscles noted  Gait as below, see gait exam in the neurologic exam  Muscle strength, tone, stability as below see the motor exam in the neurologic exam.   No bony deformities  Skin -   Warm, dry, and intact to inspection and palpation.     No rash, erythema, or pallor  Psychiatric -   Mood, affect, and behavior appears agitated  Memory as below see mental status examination in the neurologic exam    General appearance: No acute distress   EYES -   Conjunctiva normal  Pupillary exam as below, see CN exam in the neurologic exam  ENT-    No scars, masses, or lesions over external nose or ears  Oropharynx without erythema, palate midline, tongue biting noted  Cardiovascular -   No clubbing, cyanosis, or edema   Pulmonary-   Good expansion, normal effort without use of accessory muscles  Musculoskeletal -   No significant wasting of muscles noted  Gait as below, see gait exam in the neurologic exam  Muscle strength, tone, stability as below see the motor exam in the neurologic exam.   No bony deformities  Skin -   Warm, dry, and intact to inspection and palpation. No rash, erythema, or pallor  Psychiatric -   Mood, affect, and behavior appears anxious   Memory as below see mental status examination in the neurologic exam        NEUROLOGICAL EXAM     Mental status    [x]? Awake, alert, oriented   [x]? Affect attention and concentration appear appropriate  [x]? Recent and remote memory appears unremarkable  []? Speech normal without dysarthria or aphasia, comprehension and repetition intact. COMMENTS:  Speech improved   Cranial Nerves []? No VF deficit to confrontation,  optic discs normal, no papilledema on fundoscopic exam.  []? PERRLA, EOMI, no nystagmus, conjugate eye movements, no ptosis  []? Face symmetric  []? Facial sensation intact  []? Tongue midline no atrophy or fasciculations present  []? Palate midline, hearing to finger rub normal  []? Shoulder shrug and SCM testing normal  COMMENTS: PERRL, EOMI, face appears sym   Motor   [x]? 5/5 strength x 4 extremities  [x]? Normal bulk and tone  [x]? No tremor present  [x]? No rigidity or bradykinesia noted  COMMENTS:   Sensory  [x]? Sensation intact to light touch, pin prick, vibration, and proprioception BLE  []? Sensation intact to light touch, pin prick, vibration, and proprioception BUE  COMMENTS:     Coordination [x]? FTN normal bilaterally   []? HTS normal bilaterally  []? JAVI normal.   COMMENTS:    Reflexes  [x]? Symmetric and non-pathological  [x]? Toes downgoing bilaterally  [x]?  No clonus present  COMMENTS:   Gait                  []? Normal steady gait    []? Ataxic    []? Spastic     []? Magnetic     []? Shuffling  [x]? Not assessed  COMMENTS:        LABS/IMAGING:    Xr Chest Portable    Result Date: 10/21/2020  EXAMINATION: XR CHEST PORTABLE 10/21/2020 7:01 AM HISTORY: XR CHEST PORTABLE 10/21/2020 3:23 AM HISTORY: Increasing temperature COMPARISON: September 7, 2018. FINDINGS: The lungs are clear. The cardiomediastinal silhouette and pulmonary vascularity are within normal limits. Cardiac monitoring leads are present. The osseous structures and surrounding soft tissues demonstrate no acute abnormality. 1. No radiographic evidence of acute cardiopulmonary process. Signed by Dr Devika Armenta on 10/21/2020 7:01 AM      Lab Results   Component Value Date    WBC 10.6 10/23/2020    HGB 12.6 10/23/2020    HCT 38.4 10/23/2020    MCV 90.6 10/23/2020     10/23/2020     Lab Results   Component Value Date     10/23/2020    K 3.3 (L) 10/23/2020     10/23/2020    CO2 27 10/23/2020    BUN 11 10/23/2020    CREATININE 0.6 10/23/2020    GLUCOSE 92 10/23/2020    CALCIUM 8.9 10/23/2020    PROT 7.5 12/31/2014    LABALBU 4.4 12/31/2014    ALKPHOS 75 12/31/2014    AST 27 12/31/2014    ALT 33 12/31/2014    LABGLOM >60 10/23/2020    GFRAA >59 10/23/2020     Lab Results   Component Value Date    INR 1.08 10/21/2020    PROTIME 13.9 10/21/2020      EEG normal    CTA head negative. RECORD REVIEW:   Previous medical records, medications were reviewed at today's visit. Nursing/physician notes, imaging, labs and vitals reviewed. PT,OT and/or speech notes reviewed    ASSESSMENT:  37 y.o. admitted with confusion, new onset seizure activity, abnormal CT/MRI, polysubstance abuse history currently on suboxone. Exam remains much improved overnight, back to baseline, no further seizure activity noted.  MRI with scattered atypical appearing white matter abnormalities noted posteriorly, felt most likely PRES given non-inflammatory CSF and clinical improvement. Does not appear ischemic. No overt mass, abscess, or abnormal enhancement noted. Not overtly suggestive of PML. MS/ADEM felt unlikely as well given clinical improvement. Infectious or inflammatory source for MRI changes felt less likely given CSF.       PLAN:  1. Follow up remaining serum and csf labs. Will also need a follow up MRI in 1 month. She is not on any medications that would induce PRES, but question if could be elicit drug related. No history of immunocompromise or taking immunosuppressants. Blood pressure is not significantly elevated, continue to maximize. 2.  Continue Keppra at 750 mg bid. Would continue this at discharge. 3.  Ativan prn, seizure precautions. 4.  Thiamine  5. CPK slightly elevated, suspect rhabdomyolysis (mild) more so than serotonin syndrome or NMS. Continue to hold serotonergic medications and antipsychotic medications for now until CPK normalizes. Medicine following, supplementing IVF. 6.  Discontinued empiric antimicrobials from a CNS infection standpoint given non-inflammatory CSF. 7.  Home today once cleared by medicine. Follow up with me in 1 month for repeat MRI. Will sign off, call if needed     Please feel free to call with any questions. [phone] (cell phone).     Claribel Valadez, DO  Board Certified Neurology

## 2020-10-23 NOTE — PROGRESS NOTES
Physical Therapy  Name: Lázaro Nguyen  MRN:  [id number]  Date of service:  10/23/2020     10/23/20 1039   Subjective   Subjective Attempt: Pt states she has a headache and unable to participate with therapy at this time. Will cont to monitor.          Electronically signed by Taty Whipple PTA on 10/23/2020 at 10:39 AM

## 2020-10-24 LAB
CRYPTOCOCCUS NEOFORMANS/GATTI BY PCR: NOT DETECTED
CYTOMEGALOVIRUS BY PCR: NOT DETECTED
ENTEROVIRUS BY PCR: NOT DETECTED
ESCHERICHIA COLI K1 BY PCR: NOT DETECTED
HAEMOPHILUS INFLUENZAE BY PCR: NOT DETECTED
HERPES SIMPLEX VIRUS 1 BY PCR: NOT DETECTED
HERPES SIMPLEX VIRUS 2 BY PCR: NOT DETECTED
HIV 1+2 AB/AGN: NEGATIVE
HUMAN HERPESVIRUS 6 BY PCR: NOT DETECTED
HUMAN PARECHOVIRUS BY PCR: NOT DETECTED
LISTERIA MONOCYTOGENES BY PCR: NOT DETECTED
LYME (B. BURGDORFERI) AB IGG WB: NEGATIVE
LYME AB IGM BY WB:: POSITIVE
NEISSERIA MENINGITIDIS BY PCR: NOT DETECTED
STREPTOCCOCCUS PNEUMONIAE BY PCR: NOT DETECTED
STREPTOCCUS AGALACTIAE BY PCR: NOT DETECTED
VARICELLA ZOSTER VIRUS BY PCR: NOT DETECTED

## 2020-10-25 LAB
ANA IGG, ELISA: NORMAL
B BURGDORFERI AB,CSF: 0.01 LIV
BLOOD CULTURE, ROUTINE: NORMAL
VDRL CSF SCREEN: NON REACTIVE

## 2020-10-26 LAB
ALBUMIN CSF: 13 MG/DL (ref 0–35)
ALBUMIN INDEX: 3.7 RATIO (ref 0–9)
ALBUMIN, SERUM BY NEPHELOMETRY: 3500 MG/DL (ref 3500–5200)
ANGIOTENSIN CONVERTING ENZYME CSF: 0.8 U/L (ref 0–2.5)
ARSENIC BLOOD: <10 UG/L
IGG CSF: 1.5 MG/DL (ref 0–6)
IGG INDEX: 0.57 RATIO (ref 0.28–0.66)
IGG SYNTHESIS RATE CSF: <0 MG/D
IGG/ALBUMIN CSF: 0.12 RATIO (ref 0.09–0.25)
IGG: 709 MG/DL (ref 768–1632)
LEAD LEVEL BLOOD: <2 UG/DL
MERCURY BLOOD: <2.5 UG/L
MULTIPLE SCLEROSIS PANEL: ABNORMAL
MYELIN BASIC PROTEIN, CSF: 1.49 NG/ML (ref 0–5.5)
OLIGOCLONAL BANDS CSF: NEGATIVE
OLIGOCLONAL BANDS NUMBER: 0 BANDS (ref 0–1)

## 2020-10-26 RX ORDER — DOXYCYCLINE HYCLATE 100 MG/1
100 CAPSULE ORAL 2 TIMES DAILY
Qty: 42 CAPSULE | Refills: 0 | Status: SHIPPED | OUTPATIENT
Start: 2020-10-26 | End: 2020-11-16

## 2020-10-27 LAB
CALIFORNIA ENCEPHALITIS IGG AB CSF: NORMAL
EASTERN EQUINE ENCEPHALITIS IGG AB CSF: NORMAL
HERPES SIMPLEX VIRUS BY PCR: NOT DETECTED
HSV SOURCE: NORMAL
ST. LOUIS ENCEPH IGG ,CSF: NORMAL
WEST NILE IGG, CSF: 0.14 IV
WEST NILE IGG, CSF: 0.14 IV
WEST NILE IGM ANTIBODY CSF: 0.03 IV
WESTERN EQUINE ENCEPHALITIS IGG AB CSF: NORMAL

## 2020-10-28 LAB
ANAEROBIC CULTURE: NORMAL
CSF CULTURE: NORMAL
GRAM STAIN RESULT: NORMAL

## 2020-10-29 ENCOUNTER — TELEPHONE (OUTPATIENT)
Dept: NEUROSURGERY | Age: 35
End: 2020-10-29

## 2020-10-29 ENCOUNTER — TELEPHONE (OUTPATIENT)
Dept: NEUROLOGY | Age: 35
End: 2020-10-29

## 2020-10-29 NOTE — TELEPHONE ENCOUNTER
----- Message from Isabella Matias DO sent at 10/26/2020  4:30 PM CDT -----  Lyme positive, will need 21 days of doxycycline - sent to pharmacy, call and let the patient know.

## 2020-10-29 NOTE — TELEPHONE ENCOUNTER
Joselyn Mi requests that [facility] return their call. Returning missed call for trest results. The best time to reach her is Anytime. Thank you.

## 2020-10-30 NOTE — TELEPHONE ENCOUNTER
Called patient back to give the results again per Dr Samuel Madera also patient stated that her BP was 187/ 124. I told patient she needed to address her BP with her PCP or go to a urgent care clinic. Patient understood.

## 2020-10-31 ENCOUNTER — HOSPITAL ENCOUNTER (EMERGENCY)
Age: 35
Discharge: HOME | End: 2020-10-31
Payer: MEDICAID

## 2020-10-31 VITALS
SYSTOLIC BLOOD PRESSURE: 122 MMHG | DIASTOLIC BLOOD PRESSURE: 77 MMHG | RESPIRATION RATE: 16 BRPM | BODY MASS INDEX: 38.01 KG/M2 | WEIGHT: 250 LBS | HEART RATE: 68 BPM | TEMPERATURE: 98.6 F | OXYGEN SATURATION: 92 %

## 2020-10-31 LAB
ALBUMIN SERPL-MCNC: 4.5 G/DL (ref 3.5–5.2)
ALP BLD-CCNC: 85 U/L (ref 35–104)
ALT SERPL-CCNC: 14 U/L (ref 5–33)
AMPHETAMINE SCREEN, URINE: NEGATIVE
ANION GAP SERPL CALCULATED.3IONS-SCNC: 11 MMOL/L (ref 7–19)
AST SERPL-CCNC: 11 U/L (ref 5–32)
BARBITURATE SCREEN URINE: NEGATIVE
BASOPHILS ABSOLUTE: 0 K/UL (ref 0–0.2)
BASOPHILS RELATIVE PERCENT: 0 % (ref 0–1)
BENZODIAZEPINE SCREEN, URINE: NEGATIVE
BILIRUB SERPL-MCNC: <0.2 MG/DL (ref 0.2–1.2)
BILIRUBIN URINE: NEGATIVE
BLOOD, URINE: NEGATIVE
BUN BLDV-MCNC: 9 MG/DL (ref 6–20)
CALCIUM SERPL-MCNC: 9.6 MG/DL (ref 8.6–10)
CANNABINOID SCREEN URINE: POSITIVE
CHLORIDE BLD-SCNC: 99 MMOL/L (ref 98–111)
CLARITY: CLEAR
CO2: 27 MMOL/L (ref 22–29)
COCAINE METABOLITE SCREEN URINE: NEGATIVE
COLOR: YELLOW
CREAT SERPL-MCNC: 0.6 MG/DL (ref 0.5–0.9)
EOSINOPHILS ABSOLUTE: 0 K/UL (ref 0–0.6)
EOSINOPHILS RELATIVE PERCENT: 0 % (ref 0–5)
GFR AFRICAN AMERICAN: >59
GFR NON-AFRICAN AMERICAN: >60
GLUCOSE BLD-MCNC: 93 MG/DL (ref 74–109)
GLUCOSE URINE: NEGATIVE MG/DL
HCT VFR BLD CALC: 44.1 % (ref 37–47)
HEMOGLOBIN: 14.5 G/DL (ref 12–16)
IMMATURE GRANULOCYTES #: 0 K/UL
KETONES, URINE: NEGATIVE MG/DL
LEUKOCYTE ESTERASE, URINE: NEGATIVE
LYMPHOCYTES ABSOLUTE: 5.8 K/UL (ref 1.1–4.5)
LYMPHOCYTES RELATIVE PERCENT: 45 % (ref 20–40)
Lab: ABNORMAL
MCH RBC QN AUTO: 29.5 PG (ref 27–31)
MCHC RBC AUTO-ENTMCNC: 32.9 G/DL (ref 33–37)
MCV RBC AUTO: 89.6 FL (ref 81–99)
MONOCYTES ABSOLUTE: 1 K/UL (ref 0–0.9)
MONOCYTES RELATIVE PERCENT: 8 % (ref 0–10)
NEUTROPHILS ABSOLUTE: 6 K/UL (ref 1.5–7.5)
NEUTROPHILS RELATIVE PERCENT: 47 % (ref 50–65)
NITRITE, URINE: NEGATIVE
OPIATE SCREEN URINE: NEGATIVE
PDW BLD-RTO: 14.1 % (ref 11.5–14.5)
PH UA: 5.5 (ref 5–8)
PLATELET # BLD: 355 K/UL (ref 130–400)
PLATELET SLIDE REVIEW: ADEQUATE
PMV BLD AUTO: 10.1 FL (ref 9.4–12.3)
POTASSIUM REFLEX MAGNESIUM: 4 MMOL/L (ref 3.5–5)
PROTEIN UA: NEGATIVE MG/DL
RBC # BLD: 4.92 M/UL (ref 4.2–5.4)
RBC # BLD: NORMAL 10*6/UL
SODIUM BLD-SCNC: 137 MMOL/L (ref 136–145)
SPECIFIC GRAVITY UA: 1.01 (ref 1–1.03)
TOTAL PROTEIN: 7.4 G/DL (ref 6.6–8.7)
UROBILINOGEN, URINE: 0.2 E.U./DL
WBC # BLD: 12.8 K/UL (ref 4.8–10.8)

## 2020-10-31 PROCEDURE — 36415 COLL VENOUS BLD VENIPUNCTURE: CPT

## 2020-10-31 PROCEDURE — 81003 URINALYSIS AUTO W/O SCOPE: CPT

## 2020-10-31 PROCEDURE — 96375 TX/PRO/DX INJ NEW DRUG ADDON: CPT

## 2020-10-31 PROCEDURE — 85025 COMPLETE CBC W/AUTO DIFF WBC: CPT

## 2020-10-31 PROCEDURE — 99999 PR OFFICE/OUTPT VISIT,PROCEDURE ONLY: CPT | Performed by: NURSE PRACTITIONER

## 2020-10-31 PROCEDURE — 99284 EMERGENCY DEPT VISIT MOD MDM: CPT

## 2020-10-31 PROCEDURE — 80053 COMPREHEN METABOLIC PANEL: CPT

## 2020-10-31 PROCEDURE — 80307 DRUG TEST PRSMV CHEM ANLYZR: CPT

## 2020-10-31 PROCEDURE — 2580000003 HC RX 258: Performed by: NURSE PRACTITIONER

## 2020-10-31 PROCEDURE — 96374 THER/PROPH/DIAG INJ IV PUSH: CPT

## 2020-10-31 PROCEDURE — 6360000002 HC RX W HCPCS: Performed by: NURSE PRACTITIONER

## 2020-10-31 RX ORDER — ONDANSETRON 2 MG/ML
4 INJECTION INTRAMUSCULAR; INTRAVENOUS ONCE
Status: COMPLETED | OUTPATIENT
Start: 2020-10-31 | End: 2020-10-31

## 2020-10-31 RX ORDER — 0.9 % SODIUM CHLORIDE 0.9 %
500 INTRAVENOUS SOLUTION INTRAVENOUS ONCE
Status: COMPLETED | OUTPATIENT
Start: 2020-10-31 | End: 2020-10-31

## 2020-10-31 RX ORDER — KETOROLAC TROMETHAMINE 30 MG/ML
30 INJECTION, SOLUTION INTRAMUSCULAR; INTRAVENOUS ONCE
Status: COMPLETED | OUTPATIENT
Start: 2020-10-31 | End: 2020-10-31

## 2020-10-31 RX ADMIN — KETOROLAC TROMETHAMINE 30 MG: 30 INJECTION, SOLUTION INTRAMUSCULAR; INTRAVENOUS at 21:27

## 2020-10-31 RX ADMIN — SODIUM CHLORIDE 500 ML: 9 INJECTION, SOLUTION INTRAVENOUS at 21:15

## 2020-10-31 RX ADMIN — ONDANSETRON HYDROCHLORIDE 4 MG: 2 INJECTION, SOLUTION INTRAMUSCULAR; INTRAVENOUS at 21:28

## 2020-10-31 ASSESSMENT — PAIN SCALES - GENERAL
PAINLEVEL_OUTOF10: 2
PAINLEVEL_OUTOF10: 10

## 2020-10-31 ASSESSMENT — ENCOUNTER SYMPTOMS: VOMITING: 0

## 2020-10-31 ASSESSMENT — PAIN DESCRIPTION - PAIN TYPE: TYPE: ACUTE PAIN

## 2020-10-31 ASSESSMENT — PAIN DESCRIPTION - LOCATION: LOCATION: HEAD

## 2020-11-01 NOTE — ED PROVIDER NOTES
[facility]  eMERGENCY dEPARTMENT eNCOUnter      Pt Name: Candy Haines  MRN: [id number]  [place] 1985  Date of evaluation: 10/31/2020  Provider: [address] [facility] [address]       Chief Complaint   Patient presents with    Headache         HISTORY OF PRESENT ILLNESS   (Location/Symptom, Timing/Onset,Context/Setting, Quality, Duration, Modifying Factors, Severity)  Note limiting factors. Candy Haines is a 37 y.o. female who presents to the emergency department with a headache. Patient was discharged from the hospital 10/23/2020 after a 3-day stay with new onset seizures. She says since she has been home she has had more seizures. She states her last seizure was on October 29. She does complain of a headache and she complains of problem thinking. She denies alcohol or drug use. She does have an appointment with the neurologist next week. She has tried ibuprofen and Tylenol for the headache without relief. No vomiting. Pt tearful. Patient states she is taking her medication as prescribed. She has recently been put on doxycycline for positive Lyme titer. N new neurologic symptoms. No fever. No neck pain    The history is provided by the patient. Headache   Pain location:  Generalized  Onset quality:  Gradual  Duration:  2 days  Timing:  Constant  Progression:  Waxing and waning  Chronicity:  New  Associated symptoms: seizures    Associated symptoms: no fever, no focal weakness, no neck pain, no neck stiffness, no vomiting and no weakness        NursingNotes were reviewed. REVIEW OF SYSTEMS    (2-9 systems for level 4, 10 or more for level 5)     Review of Systems   Constitutional: Negative for fever. Gastrointestinal: Negative for vomiting. Musculoskeletal: Negative for neck pain and neck stiffness. Neurological: Positive for seizures and headaches. Negative for tremors, focal weakness, syncope, facial asymmetry, speech difficulty and weakness. Psychiatric/Behavioral: Negative for behavioral problems and confusion. Except as noted above the remainder of the review of systems was reviewed and negative. PAST MEDICAL HISTORY     Past Medical History:   Diagnosis Date    Anxiety     Depression          SURGICALHISTORY       Past Surgical History:   Procedure Laterality Date    CHOLECYSTECTOMY      DILATION AND CURETTAGE OF UTERUS      x2    HERNIA REPAIR      HYSTERECTOMY      TOOTH EXTRACTION  last week    TUBAL LIGATION           CURRENT MEDICATIONS       Discharge Medication List as of 10/31/2020  9:48 PM      CONTINUE these medications which have NOT CHANGED    Details   doxycycline hyclate (VIBRAMYCIN) 100 MG capsule Take 1 capsule by mouth 2 times daily for 21 days, Disp-42 capsule,R-0Normal      levETIRAcetam (KEPPRA) 750 MG tablet Take 1 tablet by mouth 2 times daily, Disp-60 tablet,R-0Normal      lisinopril (PRINIVIL;ZESTRIL) 10 MG tablet Take 1 tablet by mouth daily, Disp-30 tablet,R-0Normal      potassium chloride (KLOR-CON M) 20 MEQ extended release tablet Take 1 tablet by mouth daily, Disp-30 tablet,R-0Normal      PARoxetine (PAXIL) 40 MG tablet Take 40 mg by mouth every morningHistorical Med      cetirizine (ZYRTEC ALLERGY) 10 MG tablet Take 1 tablet by mouth daily, Disp-30 tablet, R-0Print             ALLERGIES     Patient has no known allergies.     FAMILY HISTORY       Family History   Problem Relation Age of Onset    Diabetes Mother     Cancer Father           SOCIAL HISTORY       Social History     Socioeconomic History    Marital status:      Spouse name: Not on file    Number of children: 3    Years of education: Not on file    Highest education level: Not on file   Occupational History    Not on file   Social Needs    Financial resource strain: Not on file    Food insecurity     Worry: Not on file     Inability: Not on file    Transportation needs     Medical: Not on file     Non-medical: Not on file Tobacco Use    Smoking status: Current Every Day Smoker     Packs/day: 1.50     Types: Cigarettes    Smokeless tobacco: Never Used   Substance and Sexual Activity    Alcohol use: Yes     Comment: infrequent    Drug use: Yes     Types: Marijuana    Sexual activity: Not on file   Lifestyle    Physical activity     Days per week: Not on file     Minutes per session: Not on file    Stress: Not on file   Relationships    Social connections     Talks on phone: Not on file     Gets together: Not on file     Attends Hinduism service: Not on file     Active member of club or organization: Not on file     Attends meetings of clubs or organizations: Not on file     Relationship status: Not on file    Intimate partner violence     Fear of current or ex partner: Not on file     Emotionally abused: Not on file     Physically abused: Not on file     Forced sexual activity: Not on file   Other Topics Concern    Not on file   Social History Narrative    Not on file       SCREENINGS    [place] Coma Scale  Eye Opening: Spontaneous  Best Verbal Response: Oriented  Best Motor Response: Obeys commands  [place] Coma Scale Score: 15 @FLOW([id number])@      PHYSICAL EXAM    (up to 7 for level 4, 8 or more for level 5)     ED Triage Vitals [10/31/20 2019]   BP Temp Temp src Pulse Resp SpO2 Height Weight   (!) 165/105 98.6 °F (37 °C) -- 78 20 92 % -- 250 lb (113.4 kg)       Physical Exam  Vitals signs and nursing note reviewed. Constitutional:       Appearance: She is well-developed. HENT:      Head: Normocephalic and atraumatic. Nose: No congestion. Eyes:      General: No scleral icterus. Right eye: No discharge. Left eye: No discharge. Pupils: Pupils are equal, round, and reactive to light. Neck:      Musculoskeletal: Normal range of motion and neck supple. Cardiovascular:      Rate and Rhythm: Normal rate. Pulmonary:      Effort: No respiratory distress.    Neurological:      Mental Status: She is alert and oriented to person, place, and time. Cranial Nerves: Cranial nerves are intact. Sensory: Sensation is intact. Motor: Motor function is intact. Coordination: Finger-Nose-Finger Test normal.      Gait: Gait is intact. Psychiatric:         Mood and Affect: Mood is anxious. Affect is tearful. Speech: Speech is delayed. Behavior: Behavior normal. Behavior is cooperative. DIAGNOSTIC RESULTS     EKG: All EKG's are interpreted by the Emergency Department Physician who either signs or Co-signsthis chart in the absence of a cardiologist.        RADIOLOGY:   Berdie Dung such as CT, Ultrasound and MRI are read by the radiologist. Laura indu radiographic images are visualized and preliminarily interpreted by the emergency physician with the below findings:      Interpretation per the Radiologist below, if available at the time of this note:    No orders to display         ED BEDSIDEULTRASOUND:   Performed by ED Physician -none    LABS:  Labs Reviewed   CBC WITH AUTO DIFFERENTIAL - Abnormal; Notable for the following components:       Result Value    WBC 12.8 (*)     MCHC 32.9 (*)     Neutrophils % 47.0 (*)     Lymphocytes % 45.0 (*)     Lymphocytes Absolute 5.8 (*)     Monocytes Absolute 1.00 (*)     All other components within normal limits   URINE DRUG SCREEN - Abnormal; Notable for the following components:    Cannabinoid Scrn, Ur Positive (*)     All other components within normal limits   COMPREHENSIVE METABOLIC PANEL W/ REFLEX TO MG FOR LOW K   URINE RT REFLEX TO CULTURE       All other labs were within normal range or not returned as of this dictation. EMERGENCY DEPARTMENT COURSE and DIFFERENTIALDIAGNOSIS/MDM:   Vitals:    Vitals:    10/31/20 2030 10/31/20 2040 10/31/20 2100 10/31/20 2130   BP: 139/78  138/82 122/77   Pulse: 73 70 77 68   Resp: 18  18 16   Temp:       SpO2: 91%  94% 92%   Weight:               MDM headache is better.    will discharge home and let patient follow-up with neurology soon as scheduled      CONSULTS:  None    PROCEDURES:  Unless otherwise noted below, none     Procedures    FINAL IMPRESSION      1.  Chronic nonintractable headache, unspecified headache type        DISPOSITION/PLAN   DISPOSITION        PATIENT REFERRED TO:    f/u with neurology as scheduled          DISCHARGE MEDICATIONS:  Discharge Medication List as of 10/31/2020  9:48 PM             (Please note that portions of this note were completed with a voice recognitionprogram.  Efforts were made to edit the dictations but occasionally words are mis-transcribed.)    ROBERTO Coreas (electronically signed)         ROBERTO Coreas  11/03/20 2016

## 2020-11-01 NOTE — ED TRIAGE NOTES
Pt here with daugherty states she has had it since she was admitted recently. States she is being treated for lyme disease. Pt stuttering and twitching.

## 2020-11-04 ENCOUNTER — OFFICE VISIT (OUTPATIENT)
Dept: NEUROSURGERY | Age: 35
End: 2020-11-04
Payer: MEDICAID

## 2020-11-04 ENCOUNTER — TELEPHONE (OUTPATIENT)
Dept: NEUROSURGERY | Age: 35
End: 2020-11-04

## 2020-11-04 VITALS
BODY MASS INDEX: 37.89 KG/M2 | DIASTOLIC BLOOD PRESSURE: 84 MMHG | WEIGHT: 250 LBS | HEIGHT: 68 IN | OXYGEN SATURATION: 98 % | HEART RATE: 74 BPM | SYSTOLIC BLOOD PRESSURE: 132 MMHG

## 2020-11-04 PROCEDURE — 99214 OFFICE O/P EST MOD 30 MIN: CPT | Performed by: PSYCHIATRY & NEUROLOGY

## 2020-11-04 RX ORDER — LEVETIRACETAM 750 MG/1
750 TABLET ORAL 2 TIMES DAILY
Qty: 60 TABLET | Refills: 5 | Status: SHIPPED | OUTPATIENT
Start: 2020-11-04 | End: 2021-07-21

## 2020-11-04 RX ORDER — IBUPROFEN 800 MG/1
1 TABLET ORAL PRN
COMMUNITY

## 2020-11-04 RX ORDER — BUPRENORPHINE HYDROCHLORIDE AND NALOXONE HYDROCHLORIDE DIHYDRATE 8; 2 MG/1; MG/1
2 TABLET SUBLINGUAL DAILY
COMMUNITY
Start: 2020-11-02 | End: 2022-03-23

## 2020-11-04 RX ORDER — DIAZEPAM 5 MG/1
TABLET ORAL
Qty: 2 TABLET | Refills: 0 | Status: SHIPPED | OUTPATIENT
Start: 2020-11-04 | End: 2020-11-07 | Stop reason: ALTCHOICE

## 2020-11-04 NOTE — PROGRESS NOTES
Comment: infrequent    Drug use: Yes     Types: Marijuana    Sexual activity: Yes     Partners: Male   Lifestyle    Physical activity     Days per week: Not on file     Minutes per session: Not on file    Stress: Not on file   Relationships    Social connections     Talks on phone: Not on file     Gets together: Not on file     Attends Yarsanism service: Not on file     Active member of club or organization: Not on file     Attends meetings of clubs or organizations: Not on file     Relationship status: Not on file    Intimate partner violence     Fear of current or ex partner: Not on file     Emotionally abused: Not on file     Physically abused: Not on file     Forced sexual activity: Not on file   Other Topics Concern    Not on file   Social History Narrative    Not on file       Current Outpatient Medications   Medication Sig Dispense Refill    buprenorphine-naloxone (SUBOXONE) 8-2 MG SUBL SL tablet Place 2 tablets under the tongue daily.  ibuprofen (ADVIL;MOTRIN) 800 MG tablet Take 1 tablet by mouth as needed      doxycycline hyclate (VIBRAMYCIN) 100 MG capsule Take 1 capsule by mouth 2 times daily for 21 days 42 capsule 0    levETIRAcetam (KEPPRA) 750 MG tablet Take 1 tablet by mouth 2 times daily 60 tablet 0    lisinopril (PRINIVIL;ZESTRIL) 10 MG tablet Take 1 tablet by mouth daily 30 tablet 0    potassium chloride (KLOR-CON M) 20 MEQ extended release tablet Take 1 tablet by mouth daily 30 tablet 0    PARoxetine (PAXIL) 40 MG tablet Take 40 mg by mouth every morning       No current facility-administered medications for this visit.         No Known Allergies      REVIEW OF SYSTEMS    Constitutional: []Fever []Sweats []Chills [] Recent Injury []Fatigue  [x] Denies all unless marked  HENT:[]Headache  [] Head Injury  [] Sore Throat  [] Ear Pain  []Dizziness [] Hearing Loss []Trouble Swallowing []Voice Change  [] Tinnitus [x] Denies all unless marked  Eyes:   [] Eye Pain  [] Eye Injection []Visual Disturbance  [] Ptosis   Spine:  [] Neck pain  [] Back pain  [] Sciaticia  [x] Denies all unless marked  Cardiovascular:[]Chest Pain []Palpitations [] Heart Disease  [x] Denies all unless marked  Respirtory: []Shortness of Breath []Cough  []Wheezing  [x] Denies all unless marked  Gastrointestinal:  []Abdominal Pain  []Blood in Stool  []Diarrhea []Constipation []Nausea  []Vomiting  [x] Denies all unless marked  Genitourinary:  [] Dysuria [] Enuresis [] Incontinence [] Frequency/Urgency  [] Hematuria  [x] Denies all unless marked  Musculoskeletal: [] Joint Pain [] Myalgias [] Joint Swelling [] Neck Stiffness  [x] Denies all unless marked  Skin:[] Rash [] Pallor [] Color Change [] Wound  [x] Denies all unless marked  Neurological:[] Visual Disturbance [] Double Vision [] Slurred Speech [] Trouble swallowing  [] Vertigo [] Tingling [] Numbness [] Weakness [] Loss of Balance [] Loss of Consciousness [] Memory Loss [] Tremor [] Seizure [] Syncope  [] Ataxia  [x] Denies all unless marked  Psychiatric/Behavioral:[] Depression [] Anxiety [] Confusion [] Agitation [] Behavior Problems  [] Hallucinations  [] Suicidal idiation  [x] Denies all unless marked  Sleep: []  Insomnia [] Sleep Disturbance [] Snoring [] Restless Legs [] Daytime Sleeping [] Sleep Apnea  [x] Denies all unless marked  Hematological:[] Adenopathy [] Bruises/Bleeds Easily  [x] Denies all unless marked  Endocrine: [] Cold Intolerance [] Heat Intolerance [] Polydipsia [] Polyphagia [] Polyuria  [x]Denies all unless marked  Allergic/Immunologic:[] Environmental Allergies [] Food Allergies [] Immunocompromised state  [x] Denies all unless marked    I have reviewed the above ROS with the patient and agree with the ROS as documented above.          PHYSICAL EXAM    Constitutional -   /84   Pulse 74   Ht 5' 8\" (1.727 m)   Wt 250 lb (113.4 kg)   SpO2 98%   BMI 38.01 kg/m²   General appearance: No acute distress   EYES -   Conjunctiva normal  Pupillary exam as below, see CN exam in the neurologic exam  ENT-    No scars, masses, or lesions over external nose or ears  Hearing normal bilaterally to finger rub  Cardiovascular -   RRR  No clubbing, cyanosis, or edema   Pulmonary-   Good expansion, normal effort without use of accessory muscles  CTA  Musculoskeletal -   No significant wasting of muscles noted  Gait as below, see gait exam in the neurologic exam  Muscle strength, tone, stability as below. No bony deformities  Skin -   Warm, dry, and intact to inspection and palpation. No rash, erythema, or pallor  Psychiatric -   Mood, affect, and behavior appear normal    Memory as below see mental status examination in the neurologic exam    NEUROLOGICAL EXAM    Mental status   [x]Awake, alert, oriented   [x]Affect attention and concentration appear appropriate  [x]Recent and remote memory appears unremarkable  [x]Speech normal without dysarthria or aphasia, comprehension and repetition intact. COMMENTS:    Cranial Nerves [x]No VF deficit to confrontation,  no papilledema on fundoscopic exam.  [x]PERRLA, EOMI, no nystagmus, conjugate eye movements, no ptosis  [x]Face symmetric  [x]Facial sensation intact  [x]Tongue midline no atrophy or fasciculations present  [x]Palate midline, hearing to finger rub normal bilaterally  [x]Shoulder shrug and SCM testing normal bilaterally  COMMENTS:   Motor   [x]5/5 strength x 4 extremities  [x]Normal bulk and tone  [x]No tremor present  [x]No rigidity or bradykinesia noted  COMMENTS:   Sensory  [x]Sensation intact to light touch, pin prick, vibration, and proprioception BLE  []Sensation intact to light touch, pin prick, vibration, and proprioception BUE  COMMENTS:   Coordination [x]FTN normal bilaterally   []HTS normal bilaterally  []JAVI normal bilaterally.    COMMENTS:   Reflexes  [x]Symmetric and non-pathological  [x]Toes down going bilaterally  [x]No clonus present  COMMENTS:   Gait [x]Normal steady gait    []Ataxic    []Spastic     []Magnetic     []Shuffling  COMMENTS:       LABS RECORD AND IMAGING REVIEW (As below and per HPI)    Lab Results   Component Value Date    CGCBOJEX53 272 10/21/2020     Lab Results   Component Value Date    WBC 12.8 (H) 10/31/2020    HGB 14.5 10/31/2020    HCT 44.1 10/31/2020    MCV 89.6 10/31/2020     10/31/2020     Lab Results   Component Value Date     10/31/2020    K 4.0 10/31/2020    CL 99 10/31/2020    CO2 27 10/31/2020    BUN 9 10/31/2020    CREATININE 0.6 10/31/2020    GLUCOSE 93 10/31/2020    CALCIUM 9.6 10/31/2020    PROT 7.4 10/31/2020    LABALBU 4.5 10/31/2020    BILITOT <0.2 10/31/2020    ALKPHOS 85 10/31/2020    AST 11 10/31/2020    ALT 14 10/31/2020    LABGLOM >60 10/31/2020    GFRAA >59 10/31/2020       ASSESSMENT:  35 y.o. admitted with confusion, new onset seizure activity, abnormal CT/MRI, polysubstance abuse history currently on suboxone. Still noting headaches and intermittent confusion, but much improved overall. No overt seizure activity noted. MRI with scattered atypical appearing white matter abnormalities noted posteriorly, felt most likely PRES given non-inflammatory CSF and negative extensive workup. No overt mass, abscess, or abnormal enhancement noted. Not overtly suggestive of PML. Opening pressure was elevated, but no overt papilledema, pseudotumor felt unlikely especially given MRI.     PLAN:  1.  Will repeat MRI Brain  2. She is not on any medications that would induce PRES, but question if could be elicit drug related. No history of immunocompromise or taking immunosuppressants. Blood pressure may also be playing a role, has been elevated at home, will have the patient follow up with primary to maximize treatment there. 3.  Continue Keppra at 750 mg bid. 4.  Epilepsy precautions and seizure first aid discussed. No driving, heights, swimming, tub baths, open flames, or heavy machinery.     5.  Opening pressure elevated, may be PRES related, no papilledema, pseudotumor felt unlikely. 6.  Complete doxycycline for Lyme testing abnormality but unclear if clinically significant. CSF lyme testing was negative.          Claribel Henry DO  Board Certified Neurology

## 2020-11-06 ENCOUNTER — HOSPITAL ENCOUNTER (EMERGENCY)
Age: 35
Discharge: HOME | End: 2020-11-07
Attending: EMERGENCY MEDICINE
Payer: MEDICAID

## 2020-11-06 PROCEDURE — 99999 PR OFFICE/OUTPT VISIT,PROCEDURE ONLY: CPT | Performed by: EMERGENCY MEDICINE

## 2020-11-06 PROCEDURE — 99282 EMERGENCY DEPT VISIT SF MDM: CPT

## 2020-11-06 PROCEDURE — 96374 THER/PROPH/DIAG INJ IV PUSH: CPT

## 2020-11-06 RX ORDER — ONDANSETRON 2 MG/ML
4 INJECTION INTRAMUSCULAR; INTRAVENOUS ONCE
Status: COMPLETED | OUTPATIENT
Start: 2020-11-06 | End: 2020-11-07

## 2020-11-06 RX ORDER — SODIUM CHLORIDE, SODIUM LACTATE, POTASSIUM CHLORIDE, AND CALCIUM CHLORIDE .6; .31; .03; .02 G/100ML; G/100ML; G/100ML; G/100ML
1000 INJECTION, SOLUTION INTRAVENOUS ONCE
Status: COMPLETED | OUTPATIENT
Start: 2020-11-06 | End: 2020-11-07

## 2020-11-07 VITALS
HEART RATE: 79 BPM | WEIGHT: 228 LBS | SYSTOLIC BLOOD PRESSURE: 108 MMHG | HEIGHT: 68 IN | TEMPERATURE: 98.3 F | BODY MASS INDEX: 34.56 KG/M2 | OXYGEN SATURATION: 97 % | DIASTOLIC BLOOD PRESSURE: 56 MMHG | RESPIRATION RATE: 18 BRPM

## 2020-11-07 LAB
ALBUMIN SERPL-MCNC: 4.2 G/DL (ref 3.5–5.2)
ALP BLD-CCNC: 79 U/L (ref 35–104)
ALT SERPL-CCNC: 9 U/L (ref 5–33)
AMPHETAMINE SCREEN, URINE: NEGATIVE
ANION GAP SERPL CALCULATED.3IONS-SCNC: 11 MMOL/L (ref 7–19)
AST SERPL-CCNC: 9 U/L (ref 5–32)
ATYPICAL LYMPHOCYTE RELATIVE PERCENT: 2 % (ref 0–8)
BARBITURATE SCREEN URINE: NEGATIVE
BASOPHILS ABSOLUTE: 0 K/UL (ref 0–0.2)
BASOPHILS RELATIVE PERCENT: 0 % (ref 0–1)
BENZODIAZEPINE SCREEN, URINE: NEGATIVE
BILIRUB SERPL-MCNC: <0.2 MG/DL (ref 0.2–1.2)
BILIRUBIN URINE: NEGATIVE
BLOOD, URINE: NEGATIVE
BUN BLDV-MCNC: 11 MG/DL (ref 6–20)
CALCIUM SERPL-MCNC: 9.5 MG/DL (ref 8.6–10)
CANNABINOID SCREEN URINE: POSITIVE
CHLORIDE BLD-SCNC: 101 MMOL/L (ref 98–111)
CLARITY: CLEAR
CO2: 28 MMOL/L (ref 22–29)
COCAINE METABOLITE SCREEN URINE: NEGATIVE
COLOR: YELLOW
CREAT SERPL-MCNC: 0.6 MG/DL (ref 0.5–0.9)
EOSINOPHILS ABSOLUTE: 0.23 K/UL (ref 0–0.6)
EOSINOPHILS RELATIVE PERCENT: 2 % (ref 0–5)
GFR AFRICAN AMERICAN: >59
GFR NON-AFRICAN AMERICAN: >60
GLUCOSE BLD-MCNC: 112 MG/DL (ref 74–109)
GLUCOSE URINE: NEGATIVE MG/DL
HCG(URINE) PREGNANCY TEST: NEGATIVE
HCT VFR BLD CALC: 41.4 % (ref 37–47)
HEMOGLOBIN: 13.4 G/DL (ref 12–16)
IMMATURE GRANULOCYTES #: 0 K/UL
KETONES, URINE: NEGATIVE MG/DL
LEUKOCYTE ESTERASE, URINE: NEGATIVE
LIPASE: 14 U/L (ref 13–60)
LYMPHOCYTES ABSOLUTE: 6.1 K/UL (ref 1.1–4.5)
LYMPHOCYTES RELATIVE PERCENT: 51 % (ref 20–40)
Lab: ABNORMAL
MCH RBC QN AUTO: 28.8 PG (ref 27–31)
MCHC RBC AUTO-ENTMCNC: 32.4 G/DL (ref 33–37)
MCV RBC AUTO: 89 FL (ref 81–99)
MONOCYTES ABSOLUTE: 0.2 K/UL (ref 0–0.9)
MONOCYTES RELATIVE PERCENT: 2 % (ref 0–10)
NEUTROPHILS ABSOLUTE: 5 K/UL (ref 1.5–7.5)
NEUTROPHILS RELATIVE PERCENT: 43 % (ref 50–65)
NITRITE, URINE: NEGATIVE
OPIATE SCREEN URINE: NEGATIVE
OVALOCYTES: ABNORMAL
PDW BLD-RTO: 14.1 % (ref 11.5–14.5)
PH UA: 5 (ref 5–8)
PLATELET # BLD: 259 K/UL (ref 130–400)
PLATELET SLIDE REVIEW: ADEQUATE
PMV BLD AUTO: 10.5 FL (ref 9.4–12.3)
POTASSIUM SERPL-SCNC: 3.5 MMOL/L (ref 3.5–5)
PROTEIN UA: NEGATIVE MG/DL
RBC # BLD: 4.65 M/UL (ref 4.2–5.4)
SODIUM BLD-SCNC: 140 MMOL/L (ref 136–145)
SPECIFIC GRAVITY UA: 1.01 (ref 1–1.03)
TOTAL PROTEIN: 7 G/DL (ref 6.6–8.7)
UROBILINOGEN, URINE: 0.2 E.U./DL
WBC # BLD: 11.6 K/UL (ref 4.8–10.8)

## 2020-11-07 PROCEDURE — 83690 ASSAY OF LIPASE: CPT

## 2020-11-07 PROCEDURE — 81003 URINALYSIS AUTO W/O SCOPE: CPT

## 2020-11-07 PROCEDURE — 2580000003 HC RX 258: Performed by: EMERGENCY MEDICINE

## 2020-11-07 PROCEDURE — 80307 DRUG TEST PRSMV CHEM ANLYZR: CPT

## 2020-11-07 PROCEDURE — 6360000002 HC RX W HCPCS: Performed by: EMERGENCY MEDICINE

## 2020-11-07 PROCEDURE — 36415 COLL VENOUS BLD VENIPUNCTURE: CPT

## 2020-11-07 PROCEDURE — 84703 CHORIONIC GONADOTROPIN ASSAY: CPT

## 2020-11-07 PROCEDURE — 85025 COMPLETE CBC W/AUTO DIFF WBC: CPT

## 2020-11-07 PROCEDURE — 80053 COMPREHEN METABOLIC PANEL: CPT

## 2020-11-07 RX ORDER — METOCLOPRAMIDE 10 MG/1
10 TABLET ORAL 2 TIMES DAILY
Qty: 20 TABLET | Refills: 0 | Status: SHIPPED | OUTPATIENT
Start: 2020-11-07 | End: 2021-02-22

## 2020-11-07 RX ADMIN — ONDANSETRON HYDROCHLORIDE 4 MG: 2 SOLUTION INTRAMUSCULAR; INTRAVENOUS at 00:23

## 2020-11-07 RX ADMIN — SODIUM CHLORIDE, POTASSIUM CHLORIDE, SODIUM LACTATE AND CALCIUM CHLORIDE 1000 ML: 600; 310; 30; 20 INJECTION, SOLUTION INTRAVENOUS at 00:23

## 2020-11-07 ASSESSMENT — PAIN DESCRIPTION - LOCATION: LOCATION: HEAD

## 2020-11-07 ASSESSMENT — PAIN DESCRIPTION - ORIENTATION: ORIENTATION: POSTERIOR

## 2020-11-07 ASSESSMENT — ENCOUNTER SYMPTOMS
CONSTIPATION: 0
WHEEZING: 0
SORE THROAT: 0
RHINORRHEA: 0
COLOR CHANGE: 0
COUGH: 0
BACK PAIN: 0
NAUSEA: 0
SHORTNESS OF BREATH: 0
CHEST TIGHTNESS: 0
EYE PAIN: 0
DIARRHEA: 0
ABDOMINAL DISTENTION: 0
ABDOMINAL PAIN: 0
TROUBLE SWALLOWING: 0
PHOTOPHOBIA: 0
VOMITING: 1

## 2020-11-07 ASSESSMENT — PAIN SCALES - GENERAL: PAINLEVEL_OUTOF10: 4

## 2020-11-07 ASSESSMENT — PAIN DESCRIPTION - DESCRIPTORS: DESCRIPTORS: CONSTANT

## 2020-11-07 NOTE — ED PROVIDER NOTES
[facility] EMERGENCY DEPT  eMERGENCY dEPARTMENT eNCOUnter      Pt Name: Trixie Burciaga  MRN: [id number]  [place] 1985  Date of evaluation: 11/6/2020  Provider: Bozena Mendez MD    [address] [place] [address]       Chief Complaint   Patient presents with    Emesis    Dizziness    Headache         HISTORY OF PRESENT ILLNESS   (Location/Symptom, Timing/Onset,Context/Setting, Quality, Duration, Modifying Factors, Severity)  Note limiting factors. Trixie Burciaga is a 37 y.o. female who presents to the emergency department for continued HA and vision changes. Pt seen a few weeks ago for new onset sz. 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 She returns for continued HA. Patient has not had any vomiting here in the ED. She has tried Tylenol and ibuprofen for her headache without relief. HPI    NursingNotes were reviewed. REVIEW OF SYSTEMS    (2-9 systems for level 4, 10 or more for level 5)     Review of Systems   Constitutional: Negative for activity change, appetite change, chills, fatigue and fever. HENT: Negative for congestion, ear pain, rhinorrhea, sore throat and trouble swallowing. Eyes: Negative for photophobia, pain and visual disturbance. Respiratory: Negative for cough, chest tightness, shortness of breath and wheezing. Cardiovascular: Negative for chest pain, palpitations and leg swelling. Gastrointestinal: Positive for vomiting. Negative for abdominal distention, abdominal pain, constipation, diarrhea and nausea. Genitourinary: Negative for difficulty urinating, dysuria, flank pain, urgency, vaginal bleeding and vaginal discharge. Musculoskeletal: Negative for back pain, myalgias and neck stiffness. Skin: Negative for color change, pallor and rash. Neurological: Positive for dizziness and headaches. Negative for weakness, light-headedness and numbness.    Psychiatric/Behavioral: Negative for agitation, behavioral problems, confusion, hallucinations and suicidal ideas. PAST MEDICALHISTORY     Past Medical History:   Diagnosis Date    Anxiety     Depression     Hypertension     Lyme disease          SURGICAL HISTORY       Past Surgical History:   Procedure Laterality Date    CHOLECYSTECTOMY      DILATION AND CURETTAGE OF UTERUS      x2    HERNIA REPAIR      HYSTERECTOMY      TOOTH EXTRACTION  last week    TUBAL LIGATION           CURRENT MEDICATIONS     Previous Medications    BUPRENORPHINE-NALOXONE (SUBOXONE) 8-2 MG SUBL SL TABLET    Place 2 tablets under the tongue daily. DOXYCYCLINE HYCLATE (VIBRAMYCIN) 100 MG CAPSULE    Take 1 capsule by mouth 2 times daily for 21 days    IBUPROFEN (ADVIL;MOTRIN) 800 MG TABLET    Take 1 tablet by mouth as needed    LEVETIRACETAM (KEPPRA) 750 MG TABLET    Take 1 tablet by mouth 2 times daily    LISINOPRIL (PRINIVIL;ZESTRIL) 10 MG TABLET    Take 1 tablet by mouth daily    PAROXETINE (PAXIL) 40 MG TABLET    Take 40 mg by mouth every morning    POTASSIUM CHLORIDE (KLOR-CON M) 20 MEQ EXTENDED RELEASE TABLET    Take 1 tablet by mouth daily       ALLERGIES     Patient has no known allergies.     FAMILY HISTORY       Family History   Problem Relation Age of Onset    Diabetes Mother     Cancer Father           SOCIAL HISTORY       Social History     Socioeconomic History    Marital status:      Spouse name: None    Number of children: 3    Years of education: None    Highest education level: None   Occupational History    None   Social Needs    Financial resource strain: None    Food insecurity     Worry: None     Inability: None    Transportation needs     Medical: None     Non-medical: None   Tobacco Use    Smoking status: Current Every Day Smoker     Packs/day: 1.50     Types: Cigarettes    Smokeless tobacco: Never Used   Substance and Sexual Activity    Alcohol use: Yes     Comment: infrequent    Drug use: Yes     Types: Marijuana    Sexual activity: Yes     Partners: Male   Lifestyle    Physical activity     Days per week: None     Minutes per session: None    Stress: None   Relationships    Social connections     Talks on phone: None     Gets together: None     Attends Denominational service: None     Active member of club or organization: None     Attends meetings of clubs or organizations: None     Relationship status: None    Intimate partner violence     Fear of current or ex partner: None     Emotionally abused: None     Physically abused: None     Forced sexual activity: None   Other Topics Concern    None   Social History Narrative    None       SCREENINGS             PHYSICAL EXAM    (up to 7 for level 4, 8 or more for level 5)     ED Triage Vitals   BP Temp Temp Source Pulse Resp SpO2 Height Weight   -- 11/07/20 0016 11/07/20 0016 11/07/20 0016 11/07/20 0016 11/07/20 0016 11/07/20 0018 11/07/20 0018    98.3 °F (36.8 °C) Oral 89 18 95 % 5' 8\" (1.727 m) 228 lb (103.4 kg)       Physical Exam  Vitals signs and nursing note reviewed. Constitutional:       General: She is not in acute distress. Appearance: She is well-developed. HENT:      Head: Normocephalic and atraumatic. Mouth/Throat:      Mouth: Mucous membranes are moist.      Pharynx: Oropharynx is clear. Eyes:      Conjunctiva/sclera: Conjunctivae normal.      Pupils: Pupils are equal, round, and reactive to light. Neck:      Musculoskeletal: Normal range of motion and neck supple. Vascular: No JVD. Cardiovascular:      Rate and Rhythm: Normal rate and regular rhythm. Heart sounds: Normal heart sounds. No murmur. Pulmonary:      Effort: Pulmonary effort is normal.      Breath sounds: Normal breath sounds. No wheezing or rales. Abdominal:      General: Bowel sounds are normal. There is no distension. Palpations: Abdomen is soft. Tenderness: There is no abdominal tenderness. There is no guarding or rebound. Musculoskeletal: Normal range of motion. Skin:     General: Skin is warm and dry. Capillary Refill: Capillary refill takes less than 2 seconds. Neurological:      General: No focal deficit present. Mental Status: She is alert and oriented to person, place, and time. Cranial Nerves: No cranial nerve deficit. Sensory: Sensation is intact. Motor: Motor function is intact. Coordination: Coordination is intact. Comments: Now patient has had some difficulty interacting and participating with the exam.  She keeps falling asleep. Patient will wake up long enough to interact with me and during this she appears to be neurologically intact. Her father tells me that since the diagnosis of PRES she has been more tired and fatigued than before. Psychiatric:         Behavior: Behavior normal.         Thought Content:  Thought content normal.         Judgment: Judgment normal.         DIAGNOSTIC RESULTS     EKG: All EKG's areinterpreted by the Emergency Department Physician who either signs or Co-signs this chart in the absence of a cardiologist.        RADIOLOGY:  Non-plain film images such as CT, Ultrasound and MRI are read by the radiologist. Plain radiographic images are visualized and preliminarily interpreted bythe emergency physician with the below findings:          No orders to display           LABS:  Labs Reviewed   CBC WITH AUTO DIFFERENTIAL - Abnormal; Notable for the following components:       Result Value    WBC 11.6 (*)     MCHC 32.4 (*)     Neutrophils % 43.0 (*)     Lymphocytes % 51.0 (*)     Lymphocytes Absolute 6.1 (*)     Ovalocytes Occasional (*)     All other components within normal limits   COMPREHENSIVE METABOLIC PANEL - Abnormal; Notable for the following components:    Glucose 112 (*)     All other components within normal limits   URINE DRUG SCREEN - Abnormal; Notable for the following components:    Cannabinoid Scrn, Ur Positive (*)     All other components within normal limits   LIPASE   PREGNANCY, URINE   URINE RT REFLEX TO CULTURE       All other labs were within normal range or not returned as of this dictation. EMERGENCY DEPARTMENT COURSE and DIFFERENTIAL DIAGNOSIS/MDM:   Vitals:    Vitals:    11/07/20 0016 11/07/20 0018 11/07/20 0318   BP: 113/66  (!) 108/56   Pulse: 89  79   Resp: 18  18   Temp: 98.3 °F (36.8 °C)     TempSrc: Oral     SpO2: 95%  97%   Weight:  228 lb (103.4 kg)    Height:  5' 8\" (1.727 m)        MDM  Number of Diagnoses or Management Options  Chronic nonintractable headache, unspecified headache type: new and requires workup  Diagnosis management comments: Patient return for headache. Apparently the PRES diagnosis has a residual effect of chronic headache. Patient appears to be neurologically intact. Her father tells me that since the diagnosis she has become more fatigued and sleeps frequently. Patient tells me that her headache is better at this time. The lab work is unremarkable. She has follow-up with Dr. Ruelas Friday, neurology, on 11/17/2020. I am going to start her on some Reglan in hopes that this might help with the headache. Given the fact that she does not appear to have a significant change in her mental or neurologic status, I do not feel that we need to repeat the head CT this evening. I encouraged him to follow-up with her primary care. Were told he could return here if any further issues or new complaints. Patient Progress  Patient progress: stable      Reassessment      CONSULTS:  None    PROCEDURES:  Unless otherwise noted below, none     Procedures    FINAL IMPRESSION      1.  Chronic nonintractable headache, unspecified headache type          DISPOSITION/PLAN   DISPOSITION Decision To Discharge 11/07/2020 04:53:12 AM      PATIENT REFERRED TO:  DO Benja [address] [id number]  [phone]            DISCHARGE MEDICATIONS:  New Prescriptions    METOCLOPRAMIDE (REGLAN) 10 MG TABLET    Take 1 tablet by mouth 2 times daily          (Please note that portions of this note were completed with a voice recognition program.  Efforts were made to edit thedictations but occasionally words are mis-transcribed.)    German Scruggs MD (electronically signed)  Attending Emergency Physician          Naz Lambert MD  11/07/20 3970

## 2020-11-17 RX ORDER — ONDANSETRON 4 MG/1
4 TABLET, FILM COATED ORAL EVERY 8 HOURS PRN
Qty: 20 TABLET | Refills: 1 | Status: SHIPPED | OUTPATIENT
Start: 2020-11-17 | End: 2022-03-23

## 2020-11-17 RX ORDER — DIAZEPAM 5 MG/1
TABLET ORAL
Qty: 2 TABLET | Refills: 0 | Status: SHIPPED | OUTPATIENT
Start: 2020-11-17 | End: 2020-12-17

## 2020-11-17 NOTE — TELEPHONE ENCOUNTER
Called patient to let her know her medication was sent to pharmacy made sure to let her know how to take the Valium prior to the MRI

## 2020-11-24 LAB
FUNGUS (MYCOLOGY) CULTURE: NORMAL
KOH PREP: NORMAL

## 2021-02-22 ENCOUNTER — APPOINTMENT (OUTPATIENT)
Dept: GENERAL RADIOLOGY | Age: 36
End: 2021-02-22
Payer: MEDICAID

## 2021-02-22 ENCOUNTER — HOSPITAL ENCOUNTER (EMERGENCY)
Age: 36
Discharge: HOME | End: 2021-02-23
Payer: MEDICAID

## 2021-02-22 ENCOUNTER — APPOINTMENT (OUTPATIENT)
Dept: CT IMAGING | Age: 36
End: 2021-02-22
Payer: MEDICAID

## 2021-02-22 DIAGNOSIS — R11.2 NON-INTRACTABLE VOMITING WITH NAUSEA, UNSPECIFIED VOMITING TYPE: Primary | ICD-10-CM

## 2021-02-22 DIAGNOSIS — R56.9 SEIZURE (HCC): ICD-10-CM

## 2021-02-22 LAB
ADENOVIRUS BY PCR: NOT DETECTED
ALBUMIN SERPL-MCNC: 4.6 G/DL (ref 3.5–5.2)
ALP BLD-CCNC: 101 U/L (ref 35–104)
ALT SERPL-CCNC: 32 U/L (ref 5–33)
AMPHETAMINE SCREEN, URINE: NEGATIVE
ANION GAP SERPL CALCULATED.3IONS-SCNC: 16 MMOL/L (ref 7–19)
AST SERPL-CCNC: 31 U/L (ref 5–32)
BACTERIA: ABNORMAL /HPF
BARBITURATE SCREEN URINE: NEGATIVE
BASOPHILS ABSOLUTE: 0 K/UL (ref 0–0.2)
BASOPHILS RELATIVE PERCENT: 0.2 % (ref 0–1)
BENZODIAZEPINE SCREEN, URINE: NEGATIVE
BILIRUB SERPL-MCNC: 0.3 MG/DL (ref 0.2–1.2)
BILIRUBIN URINE: NEGATIVE
BLOOD, URINE: ABNORMAL
BORDETELLA PARAPERTUSSIS BY PCR: NOT DETECTED
BORDETELLA PERTUSSIS BY PCR: NOT DETECTED
BUN BLDV-MCNC: 6 MG/DL (ref 6–20)
CALCIUM SERPL-MCNC: 9.5 MG/DL (ref 8.6–10)
CANNABINOID SCREEN URINE: POSITIVE
CHLAMYDOPHILIA PNEUMONIAE BY PCR: NOT DETECTED
CHLORIDE BLD-SCNC: 98 MMOL/L (ref 98–111)
CLARITY: CLEAR
CO2: 23 MMOL/L (ref 22–29)
COCAINE METABOLITE SCREEN URINE: NEGATIVE
COLOR: YELLOW
CORONAVIRUS 229E BY PCR: NOT DETECTED
CORONAVIRUS HKU1 BY PCR: NOT DETECTED
CORONAVIRUS NL63 BY PCR: NOT DETECTED
CORONAVIRUS OC43 BY PCR: NOT DETECTED
CREAT SERPL-MCNC: 0.7 MG/DL (ref 0.5–0.9)
CRYSTALS, UA: ABNORMAL /HPF
EOSINOPHILS ABSOLUTE: 0 K/UL (ref 0–0.6)
EOSINOPHILS RELATIVE PERCENT: 0.3 % (ref 0–5)
EPITHELIAL CELLS, UA: 9 /HPF (ref 0–5)
ETHANOL: <10 MG/DL (ref 0–0.08)
GFR AFRICAN AMERICAN: >59
GFR NON-AFRICAN AMERICAN: >60
GLUCOSE BLD-MCNC: 147 MG/DL (ref 74–109)
GLUCOSE URINE: NEGATIVE MG/DL
HCG QUALITATIVE: NEGATIVE
HCT VFR BLD CALC: 47.1 % (ref 37–47)
HEMOGLOBIN: 15.2 G/DL (ref 12–16)
HUMAN METAPNEUMOVIRUS BY PCR: NOT DETECTED
HUMAN RHINOVIRUS/ENTEROVIRUS BY PCR: DETECTED
HYALINE CASTS: 4 /HPF (ref 0–8)
IMMATURE GRANULOCYTES #: 0.1 K/UL
INFLUENZA A BY PCR: NOT DETECTED
INFLUENZA B BY PCR: NOT DETECTED
KETONES, URINE: 40 MG/DL
LEUKOCYTE ESTERASE, URINE: NEGATIVE
LYMPHOCYTES ABSOLUTE: 1.6 K/UL (ref 1.1–4.5)
LYMPHOCYTES RELATIVE PERCENT: 12.5 % (ref 20–40)
Lab: ABNORMAL
MCH RBC QN AUTO: 28.5 PG (ref 27–31)
MCHC RBC AUTO-ENTMCNC: 32.3 G/DL (ref 33–37)
MCV RBC AUTO: 88.2 FL (ref 81–99)
MONOCYTES ABSOLUTE: 0.6 K/UL (ref 0–0.9)
MONOCYTES RELATIVE PERCENT: 4.9 % (ref 0–10)
MYCOPLASMA PNEUMONIAE BY PCR: NOT DETECTED
NEUTROPHILS ABSOLUTE: 10.6 K/UL (ref 1.5–7.5)
NEUTROPHILS RELATIVE PERCENT: 81.6 % (ref 50–65)
NITRITE, URINE: NEGATIVE
OPIATE SCREEN URINE: NEGATIVE
PARAINFLUENZA VIRUS 1 BY PCR: NOT DETECTED
PARAINFLUENZA VIRUS 2 BY PCR: NOT DETECTED
PARAINFLUENZA VIRUS 3 BY PCR: NOT DETECTED
PARAINFLUENZA VIRUS 4 BY PCR: NOT DETECTED
PDW BLD-RTO: 14.5 % (ref 11.5–14.5)
PH UA: 8 (ref 5–8)
PLATELET # BLD: 235 K/UL (ref 130–400)
PMV BLD AUTO: 11 FL (ref 9.4–12.3)
POTASSIUM SERPL-SCNC: 4 MMOL/L (ref 3.5–5)
PROTEIN UA: 100 MG/DL
RBC # BLD: 5.34 M/UL (ref 4.2–5.4)
RBC UA: 7 /HPF (ref 0–4)
RESPIRATORY SYNCYTIAL VIRUS BY PCR: NOT DETECTED
SARS-COV-2, PCR: NOT DETECTED
SODIUM BLD-SCNC: 137 MMOL/L (ref 136–145)
SPECIFIC GRAVITY UA: 1.02 (ref 1–1.03)
TOTAL PROTEIN: 7.6 G/DL (ref 6.6–8.7)
UROBILINOGEN, URINE: 0.2 E.U./DL
WBC # BLD: 12.9 K/UL (ref 4.8–10.8)
WBC UA: 6 /HPF (ref 0–5)

## 2021-02-22 PROCEDURE — 82077 ASSAY SPEC XCP UR&BREATH IA: CPT

## 2021-02-22 PROCEDURE — 96372 THER/PROPH/DIAG INJ SC/IM: CPT

## 2021-02-22 PROCEDURE — 84703 CHORIONIC GONADOTROPIN ASSAY: CPT

## 2021-02-22 PROCEDURE — 0202U NFCT DS 22 TRGT SARS-COV-2: CPT

## 2021-02-22 PROCEDURE — 80053 COMPREHEN METABOLIC PANEL: CPT

## 2021-02-22 PROCEDURE — 6360000004 HC RX CONTRAST MEDICATION: Performed by: NURSE PRACTITIONER

## 2021-02-22 PROCEDURE — 2580000003 HC RX 258: Performed by: NURSE PRACTITIONER

## 2021-02-22 PROCEDURE — 96374 THER/PROPH/DIAG INJ IV PUSH: CPT

## 2021-02-22 PROCEDURE — 85025 COMPLETE CBC W/AUTO DIFF WBC: CPT

## 2021-02-22 PROCEDURE — 96375 TX/PRO/DX INJ NEW DRUG ADDON: CPT

## 2021-02-22 PROCEDURE — C9113 INJ PANTOPRAZOLE SODIUM, VIA: HCPCS | Performed by: NURSE PRACTITIONER

## 2021-02-22 PROCEDURE — 74177 CT ABD & PELVIS W/CONTRAST: CPT

## 2021-02-22 PROCEDURE — 71045 X-RAY EXAM CHEST 1 VIEW: CPT

## 2021-02-22 PROCEDURE — 99285 EMERGENCY DEPT VISIT HI MDM: CPT

## 2021-02-22 PROCEDURE — 80307 DRUG TEST PRSMV CHEM ANLYZR: CPT

## 2021-02-22 PROCEDURE — 36415 COLL VENOUS BLD VENIPUNCTURE: CPT

## 2021-02-22 PROCEDURE — 81001 URINALYSIS AUTO W/SCOPE: CPT

## 2021-02-22 PROCEDURE — 6360000002 HC RX W HCPCS: Performed by: NURSE PRACTITIONER

## 2021-02-22 PROCEDURE — 93005 ELECTROCARDIOGRAM TRACING: CPT | Performed by: NURSE PRACTITIONER

## 2021-02-22 PROCEDURE — 80177 DRUG SCRN QUAN LEVETIRACETAM: CPT

## 2021-02-22 RX ORDER — 0.9 % SODIUM CHLORIDE 0.9 %
1000 INTRAVENOUS SOLUTION INTRAVENOUS ONCE
Status: COMPLETED | OUTPATIENT
Start: 2021-02-22 | End: 2021-02-22

## 2021-02-22 RX ORDER — ONDANSETRON 2 MG/ML
4 INJECTION INTRAMUSCULAR; INTRAVENOUS ONCE
Status: COMPLETED | OUTPATIENT
Start: 2021-02-22 | End: 2021-02-22

## 2021-02-22 RX ORDER — SODIUM CHLORIDE 9 MG/ML
10 INJECTION INTRAVENOUS DAILY
Status: DISCONTINUED | OUTPATIENT
Start: 2021-02-22 | End: 2021-02-22

## 2021-02-22 RX ORDER — PROMETHAZINE HYDROCHLORIDE 25 MG/ML
12.5 INJECTION, SOLUTION INTRAMUSCULAR; INTRAVENOUS ONCE
Status: COMPLETED | OUTPATIENT
Start: 2021-02-22 | End: 2021-02-22

## 2021-02-22 RX ORDER — ONDANSETRON 2 MG/ML
INJECTION INTRAMUSCULAR; INTRAVENOUS
Status: DISCONTINUED
Start: 2021-02-22 | End: 2021-02-23 | Stop reason: HOSPADM

## 2021-02-22 RX ORDER — LEVETIRACETAM 10 MG/ML
1000 INJECTION INTRAVASCULAR ONCE
Status: COMPLETED | OUTPATIENT
Start: 2021-02-22 | End: 2021-02-23

## 2021-02-22 RX ORDER — PROMETHAZINE HYDROCHLORIDE 25 MG/ML
25 INJECTION, SOLUTION INTRAMUSCULAR; INTRAVENOUS ONCE
Status: COMPLETED | OUTPATIENT
Start: 2021-02-22 | End: 2021-02-22

## 2021-02-22 RX ORDER — SODIUM CHLORIDE 9 MG/ML
10 INJECTION INTRAVENOUS DAILY
Status: DISCONTINUED | OUTPATIENT
Start: 2021-02-23 | End: 2021-02-23 | Stop reason: HOSPADM

## 2021-02-22 RX ORDER — HYDROMORPHONE HYDROCHLORIDE 1 MG/ML
0.5 INJECTION, SOLUTION INTRAMUSCULAR; INTRAVENOUS; SUBCUTANEOUS ONCE
Status: COMPLETED | OUTPATIENT
Start: 2021-02-22 | End: 2021-02-22

## 2021-02-22 RX ORDER — KETOROLAC TROMETHAMINE 30 MG/ML
30 INJECTION, SOLUTION INTRAMUSCULAR; INTRAVENOUS ONCE
Status: COMPLETED | OUTPATIENT
Start: 2021-02-22 | End: 2021-02-22

## 2021-02-22 RX ORDER — PANTOPRAZOLE SODIUM 40 MG/10ML
40 INJECTION, POWDER, LYOPHILIZED, FOR SOLUTION INTRAVENOUS ONCE
Status: DISCONTINUED | OUTPATIENT
Start: 2021-02-22 | End: 2021-02-23 | Stop reason: HOSPADM

## 2021-02-22 RX ORDER — PANTOPRAZOLE SODIUM 40 MG/10ML
40 INJECTION, POWDER, LYOPHILIZED, FOR SOLUTION INTRAVENOUS DAILY
Status: DISCONTINUED | OUTPATIENT
Start: 2021-02-22 | End: 2021-02-22

## 2021-02-22 RX ADMIN — HYDROMORPHONE HYDROCHLORIDE 0.5 MG: 1 INJECTION, SOLUTION INTRAMUSCULAR; INTRAVENOUS; SUBCUTANEOUS at 22:27

## 2021-02-22 RX ADMIN — ONDANSETRON HYDROCHLORIDE 4 MG: 2 SOLUTION INTRAMUSCULAR; INTRAVENOUS at 20:22

## 2021-02-22 RX ADMIN — SODIUM CHLORIDE 1000 ML: 9 INJECTION, SOLUTION INTRAVENOUS at 22:28

## 2021-02-22 RX ADMIN — IOPAMIDOL 90 ML: 755 INJECTION, SOLUTION INTRAVENOUS at 21:05

## 2021-02-22 RX ADMIN — PANTOPRAZOLE SODIUM 40 MG: 40 INJECTION, POWDER, FOR SOLUTION INTRAVENOUS at 20:40

## 2021-02-22 RX ADMIN — KETOROLAC TROMETHAMINE 30 MG: 30 INJECTION, SOLUTION INTRAMUSCULAR at 22:26

## 2021-02-22 RX ADMIN — PROMETHAZINE HYDROCHLORIDE 12.5 MG: 25 INJECTION INTRAMUSCULAR; INTRAVENOUS at 20:39

## 2021-02-22 RX ADMIN — SODIUM CHLORIDE 1000 ML: 9 INJECTION, SOLUTION INTRAVENOUS at 20:33

## 2021-02-22 RX ADMIN — PROMETHAZINE HYDROCHLORIDE 25 MG: 25 INJECTION INTRAMUSCULAR; INTRAVENOUS at 22:26

## 2021-02-22 ASSESSMENT — PAIN SCALES - GENERAL
PAINLEVEL_OUTOF10: 3
PAINLEVEL_OUTOF10: 3

## 2021-02-23 VITALS
WEIGHT: 230 LBS | HEART RATE: 81 BPM | SYSTOLIC BLOOD PRESSURE: 112 MMHG | BODY MASS INDEX: 34.86 KG/M2 | HEIGHT: 68 IN | OXYGEN SATURATION: 92 % | RESPIRATION RATE: 16 BRPM | DIASTOLIC BLOOD PRESSURE: 66 MMHG | TEMPERATURE: 98.5 F

## 2021-02-23 PROCEDURE — 96375 TX/PRO/DX INJ NEW DRUG ADDON: CPT

## 2021-02-23 PROCEDURE — 6360000002 HC RX W HCPCS: Performed by: NURSE PRACTITIONER

## 2021-02-23 RX ADMIN — LEVETIRACETAM 1000 MG: 10 INJECTION INTRAVENOUS at 00:00

## 2021-02-23 NOTE — ED PROVIDER NOTES
[facility] EMERGENCY DEPT  eMERGENCY dEPARTMENT eNCOUnter      Pt Name: Denise Cantrell  MRN: [id number]  [place] 1985  Date of evaluation: 2/22/2021  Provider: Christine Villafana [address]       Chief Complaint   Patient presents with    Seizures    Fever         HISTORY OF PRESENT ILLNESS   (Location/Symptom, Timing/Onset,Context/Setting, Quality, Duration, Modifying Factors, Severity)  Note limiting factors. Denise Cantrell is a 31 y.o. female who presents to the emergency department by ambulance with vomiting and a seizure. Patient is a poor historian. She does complain of abdominal pain. Denies drug use except for occasional marijuana. Did not take her keppra today because of vomiting. No diarrhea. No fever. Pt is bleeding from her mouth due to an injury. The history is provided by the patient. Seizures  PTA treatment:  None  History of seizures: yes    Similar to previous episodes: yes    Seizure control level: unknown. Current therapy:  Levetiracetam  Fever  Temp source:  Subjective      NursingNotes were reviewed. REVIEW OF SYSTEMS    (2-9 systems for level 4, 10 or more for level 5)     Review of Systems   Constitutional: Positive for fever. Neurological: Positive for seizures. Except as noted above the remainder of the review of systems was reviewed and negative.        PAST MEDICAL HISTORY     Past Medical History:   Diagnosis Date    Anxiety     Depression     Hypertension     Lyme disease          SURGICALHISTORY       Past Surgical History:   Procedure Laterality Date    CHOLECYSTECTOMY      DILATION AND CURETTAGE OF UTERUS      x2    HERNIA REPAIR      HYSTERECTOMY      TOOTH EXTRACTION  last week    TUBAL LIGATION           CURRENT MEDICATIONS       Discharge Medication List as of 2/23/2021  2:34 AM      CONTINUE these medications which have NOT CHANGED    Details   buprenorphine-naloxone (SUBOXONE) 8-2 MG SUBL SL tablet Place 2 tablets under the tongue daily.Historical Med      ibuprofen (ADVIL;MOTRIN) 800 MG tablet Take 1 tablet by mouth as neededHistorical Med      levETIRAcetam (KEPPRA) 750 MG tablet Take 1 tablet by mouth 2 times daily, Disp-60 tablet,R-5Normal      lisinopril (PRINIVIL;ZESTRIL) 10 MG tablet Take 1 tablet by mouth daily, Disp-30 tablet,R-0Normal      PARoxetine (PAXIL) 40 MG tablet Take 40 mg by mouth every morningHistorical Med      ondansetron (ZOFRAN) 4 MG tablet Take 1 tablet by mouth every 8 hours as needed for Nausea or Vomiting, Disp-20 tablet,R-1Normal             ALLERGIES     Patient has no known allergies.     FAMILY HISTORY       Family History   Problem Relation Age of Onset    Diabetes Mother     Cancer Father           SOCIAL HISTORY       Social History     Socioeconomic History    Marital status:      Spouse name: None    Number of children: 3    Years of education: None    Highest education level: None   Occupational History    None   Social Needs    Financial resource strain: None    Food insecurity     Worry: None     Inability: None    Transportation needs     Medical: None     Non-medical: None   Tobacco Use    Smoking status: Current Every Day Smoker     Packs/day: 1.50     Types: Cigarettes    Smokeless tobacco: Never Used   Substance and Sexual Activity    Alcohol use: Not Currently     Comment: infrequent    Drug use: Yes     Types: Marijuana    Sexual activity: Yes     Partners: Male   Lifestyle    Physical activity     Days per week: None     Minutes per session: None    Stress: None   Relationships    Social connections     Talks on phone: None     Gets together: None     Attends Latter day service: None     Active member of club or organization: None     Attends meetings of clubs or organizations: None     Relationship status: None    Intimate partner violence     Fear of current or ex partner: None     Emotionally abused: None     Physically abused: None     Forced sexual activity: None Other Topics Concern    None   Social History Narrative    None       SCREENINGS    [place] Coma Scale  Eye Opening: Spontaneous  Best Verbal Response: Oriented  Best Motor Response: Obeys commands  [place] Coma Scale Score: 15 @FLOW([id number])@      PHYSICAL EXAM    (up to 7 for level 4, 8 or more for level 5)     ED Triage Vitals   BP Temp Temp Source Pulse Resp SpO2 Height Weight   02/22/21 1948 02/22/21 1948 02/22/21 1948 02/22/21 1948 02/22/21 1948 02/22/21 1948 02/22/21 1943 02/22/21 1943   (!) 152/83 98.5 °F (36.9 °C) Oral 79 18 98 % 5' 8\" (1.727 m) 230 lb (104.3 kg)       Physical Exam  Constitutional:       Appearance: Normal appearance. She is obese. She is ill-appearing. Comments: vomiting   HENT:      Mouth/Throat:      Dentition: Abnormal dentition. Dental caries present. Pharynx: Uvula midline. Comments: Bleeding from right upper gums  Abdominal:      General: Abdomen is protuberant. Bowel sounds are normal. There is no distension. Palpations: Abdomen is soft. Tenderness: There is guarding. There is no rebound. Hernia: A hernia is present. Hernia is present in the umbilical area. Neurological:      Mental Status: She is alert. DIAGNOSTIC RESULTS     EKG: All EKG's are interpreted by the Emergency Department Physician who either signs or Co-signsthis chart in the absence of a cardiologist.  76 sinus rhythm QT interval is 439 QTC is 494 no T wave changes read at 2003 by Dr. Mirna Cox:   Branda Ligas such as CT, Ultrasound and MRI are read by the radiologist. Dayan Suggs radiographic images are visualized and preliminarily interpreted by the emergency physician with the below findings:      Interpretation per the Radiologist below, if available at the time of this note:    CT ABDOMEN PELVIS W IV CONTRAST Additional Contrast? None   Final Result   1. No acute abdominal pelvic abnormalities. 2.  Fat-containing umbilical hernia.    3.  Severe hepatosteatosis. Signed by Dr Amber Noble on 2/22/2021 9:47 PM      XR CHEST PORTABLE   Final Result   Impression:   No acute cardiopulmonary disease. Signed by Dr Amber Noble on 2/22/2021 9:08 PM            ED BEDSIDEULTRASOUND:   Performed by ED Physician -none    LABS:  Labs Reviewed   RESPIRATORY PANEL, MOLECULAR, WITH COVID-19 - Abnormal; Notable for the following components:       Result Value    Human Rhinovirus/Enterovirus by PCR DETECTED (*)     All other components within normal limits   CBC WITH AUTO DIFFERENTIAL - Abnormal; Notable for the following components:    WBC 12.9 (*)     Hematocrit 47.1 (*)     MCHC 32.3 (*)     Neutrophils % 81.6 (*)     Lymphocytes % 12.5 (*)     Neutrophils Absolute 10.6 (*)     All other components within normal limits   COMPREHENSIVE METABOLIC PANEL - Abnormal; Notable for the following components:    Glucose 147 (*)     All other components within normal limits   URINE RT REFLEX TO CULTURE - Abnormal; Notable for the following components:    Ketones, Urine 40 (*)     Blood, Urine TRACE (*)     Protein,  (*)     All other components within normal limits   URINE DRUG SCREEN - Abnormal; Notable for the following components:    Cannabinoid Scrn, Ur Positive (*)     All other components within normal limits   MICROSCOPIC URINALYSIS - Abnormal; Notable for the following components:    Bacteria, UA 1+ (*)     Crystals, UA NEG (*)     WBC, UA 6 (*)     RBC, UA 7 (*)     All other components within normal limits   HCG, SERUM, QUALITATIVE   ETHANOL   LEVETIRACETAM LEVEL       All other labs were within normal range or not returned as of this dictation.     EMERGENCY DEPARTMENT COURSE and DIFFERENTIALDIAGNOSIS/MDM:   Vitals:    Vitals:    02/22/21 2315 02/23/21 0100 02/23/21 0130 02/23/21 0200   BP: 119/72 123/72 111/82 112/66   Pulse: 76 79 79 81   Resp: 20 16 16 16   Temp:       TempSrc:       SpO2: 95% 90% 91% 92%   Weight:       Height:               MDM  Much better after fluids and meds. Will load with keppra and have her follow with neurology. Mouth injury is no longer bleeding      CONSULTS:  None    PROCEDURES:  Unless otherwise noted below, none     Procedures    FINAL IMPRESSION      1. Non-intractable vomiting with nausea, unspecified vomiting type    2.  Seizure [facility])        DISPOSITION/PLAN   DISPOSITION        PATIENT REFERRED TO:  DO Manuel Hassan [address] [place]             DISCHARGE MEDICATIONS:  Discharge Medication List as of 2/23/2021  2:34 AM             (Please note that portions of this note were completed with a voice recognitionprogram.  Efforts were made to edit the dictations but occasionally words are mis-transcribed.)    ROBERTO Downing (electronically signed)          ROBERTO Downing  02/23/21 7950

## 2021-02-23 NOTE — ED NOTES
Pt began to have bleeding from the mouth. Pt then began vomiting bile.       Alfa Sanchez RN  02/22/21 2025

## 2021-02-23 NOTE — ED NOTES
EMS sates pt had seizure witnessed by family.  Pt reported to have hx of seizures EMS pt was post-ictal upon arrival, pt alert and oriented upon arrival to hospital.     Juan M Jordan RN  02/22/21 2008

## 2021-02-24 LAB
EKG P AXIS: 60 DEGREES
EKG P-R INTERVAL: 152 MS
EKG Q-T INTERVAL: 430 MS
EKG QRS DURATION: 86 MS
EKG QTC CALCULATION (BAZETT): 456 MS
EKG T AXIS: 45 DEGREES

## 2021-02-26 LAB — KEPPRA: <2 UG/ML (ref 12–46)

## 2021-03-01 ENCOUNTER — TELEPHONE (OUTPATIENT)
Dept: NEUROSURGERY | Age: 36
End: 2021-03-01

## 2021-03-01 NOTE — TELEPHONE ENCOUNTER
Received a call from Pain Management that Patient had scheduled an appointment with Emmanuel Bonilla 3-1-21 at 12:00 and 12:15pm. Patient scheduled with wrong office should have been for [address]. Left voicemail for patient that the appointment would be cancelled and she will need to schedule an appointment with Emmanuel Bonilla in [place] [address]. Left call back number [phone].

## 2021-03-17 ENCOUNTER — TELEPHONE (OUTPATIENT)
Dept: NEUROSURGERY | Age: 36
End: 2021-03-17

## 2021-03-17 NOTE — TELEPHONE ENCOUNTER
Pratik Edward requests that office  return their call. The best time to reach her is Anytime. Patient had cancel her follow up appointment waiting on her  Covid test to come back, Patient ask get in sooner then [facility] could scheduled,please call patient. Thank you.

## 2021-08-17 ENCOUNTER — OFFICE VISIT (OUTPATIENT)
Dept: NEUROSURGERY | Age: 36
End: 2021-08-17
Payer: MEDICAID

## 2021-08-17 VITALS
DIASTOLIC BLOOD PRESSURE: 94 MMHG | WEIGHT: 260 LBS | BODY MASS INDEX: 39.4 KG/M2 | SYSTOLIC BLOOD PRESSURE: 155 MMHG | HEIGHT: 68 IN | HEART RATE: 66 BPM

## 2021-08-17 DIAGNOSIS — R93.0 ABNORMAL MRI OF HEAD: ICD-10-CM

## 2021-08-17 DIAGNOSIS — G40.909 SEIZURE DISORDER (HCC): ICD-10-CM

## 2021-08-17 DIAGNOSIS — I67.83 POSTERIOR REVERSIBLE ENCEPHALOPATHY SYNDROME: Primary | ICD-10-CM

## 2021-08-17 PROCEDURE — 99214 OFFICE O/P EST MOD 30 MIN: CPT | Performed by: PSYCHIATRY & NEUROLOGY

## 2021-08-17 RX ORDER — LEVETIRACETAM 500 MG/1
1000 TABLET ORAL 2 TIMES DAILY
Qty: 120 TABLET | Refills: 5 | Status: SHIPPED | OUTPATIENT
Start: 2021-08-17

## 2021-08-17 NOTE — PROGRESS NOTES
[facility] Neurology Office Note      Patient:   Xavier Del Castillo  MR#:    [id number]  Account Number:                         YOB: 1985  Date of Evaluation:  8/17/2021  Time of Note:                          2:00 PM  Consulting Physician:  Jermaine Mae DO    FOLLOW UP    Chief Complaint   Patient presents with    Follow-up     seizures       HISTORY OF PRESENT ILLNESS    Xavier Del Castillo is a 31y.o. year old female here for seizures, follow up. Previously admitted with confusion, new onset seizure activity, abnormal CT/MRI, polysubstance abuse. MRI suggestive of PRES, other workup including metabolic, inflammatory labs largely negative. CSF negative as well. Possible further seizure activity noted. Patient still noting headaches and some possible intermittent confusion. MRI was not repeated. More stress and anxiety seems to be worsening, on paxil, no SI/HI noted.        Past Medical History:   Diagnosis Date    Anxiety     Depression     Hypertension     Lyme disease        Past Surgical History:   Procedure Laterality Date    CHOLECYSTECTOMY      DILATION AND CURETTAGE OF UTERUS      x2    HERNIA REPAIR      HYSTERECTOMY      TOOTH EXTRACTION  last week    TUBAL LIGATION         Family History   Problem Relation Age of Onset    Diabetes Mother     Cancer Father        Social History     Socioeconomic History    Marital status:      Spouse name: Not on file    Number of children: 3    Years of education: Not on file    Highest education level: Not on file   Occupational History    Not on file   Tobacco Use    Smoking status: Current Every Day Smoker     Packs/day: 1.50     Types: Cigarettes    Smokeless tobacco: Never Used   Vaping Use    Vaping Use: Never used   Substance and Sexual Activity    Alcohol use: Not Currently     Comment: infrequent    Drug use: Yes     Types: Marijuana    Sexual activity: Yes     Partners: Male   Other Topics Concern    Not on file Social History Narrative    Not on file     Social Determinants of Health     Financial Resource Strain:     Difficulty of Paying Living Expenses:    Food Insecurity:     Worried About Running Out of Food in the Last Year:     [address] in the Last Year:    Transportation Needs:     Lack of Transportation (Medical):  Lack of Transportation (Non-Medical):    Physical Activity:     Days of Exercise per Week:     Minutes of Exercise per Session:    Stress:     Feeling of Stress :    Social Connections:     Frequency of Communication with Friends and Family:     Frequency of Social Gatherings with Friends and Family:     Attends Mosque Services:     Active Member of Clubs or Organizations:     Attends Club or Organization Meetings:     Marital Status:    Intimate Partner Violence:     Fear of Current or Ex-Partner:     Emotionally Abused:     Physically Abused:     Sexually Abused:        Current Outpatient Medications   Medication Sig Dispense Refill    levETIRAcetam (KEPPRA) 750 MG tablet TAKE 1 TABLET BY MOUTH TWICE DAILY 60 tablet 5    ibuprofen (ADVIL;MOTRIN) 800 MG tablet Take 1 tablet by mouth as needed      PARoxetine (PAXIL) 40 MG tablet Take 40 mg by mouth every morning      ondansetron (ZOFRAN) 4 MG tablet Take 1 tablet by mouth every 8 hours as needed for Nausea or Vomiting 20 tablet 1    buprenorphine-naloxone (SUBOXONE) 8-2 MG SUBL SL tablet Place 2 tablets under the tongue daily.  lisinopril (PRINIVIL;ZESTRIL) 10 MG tablet Take 1 tablet by mouth daily 30 tablet 0     No current facility-administered medications for this visit.      ALLERGIES   No Known Allergies      REVIEW OF SYSTEMS    Constitutional: []Fever []Sweat []Chills [] Recent Injury [x] Denies all unless marked  HEENT:[]Headache  [] Head Injury/Hearing Loss  [] Sore Throat  [] Ear Ache/Dizziness  [x] Denies all unless marked  Spine:  [] Neck pain  [] Back pain  [] Sciaticia  [x] Denies all unless marked  Cardiovascular:[]Heart Disease []Chest Pain [] Palpitations  [x] Denies all unless marked  Pulmonary: []Shortness of Breath []Cough   [x] Denies all unless marke  Gastrointestinal: []Nausea  []Vomiting  []Abdominal Pain  []Constipation  []Diarrhea  []Dark Bloody Stools  [x] Denies all unless marked  Psychiatric/Behavioral:[] Depression [] Anxiety [x] Denies all unless marked  Genitourinary:   [] Frequency  [] Urgency  [] Incontinence [] Pain with Urination  [x] Denies all unless marked  Extremities: []Pain  []Swelling  [x] Denies all unless marked  Musculoskeletal: [] Muscle Pain  [] Joint Pain  [] Arthritis [] Muscle Cramps [] Muscle Twitches  [x] Denies all unless marked  Sleep: [] Insomnia [] Snoring [] Restless Legs [] Sleep Apnea  [] Daytime Sleepiness  [x] Denies all unless marked  Skin:[] Rash [] Skin Discoloration [x] Denies all unless marked   Neurological: []Visual Disturbance/Memory Loss [] Loss of Balance [] Slurred Speech/Weakness [] Seizures  [] Vertigo/Dizziness [x] Denies all unless marked     I have reviewed the above ROS with the patient and agree with the ROS as documented above. PHYSICAL EXAM    Constitutional    BP (!) 155/94   Pulse 66   Ht 5' 8\" (1.727 m)   Wt 260 lb (117.9 kg)   BMI 39.53 kg/m²   General appearance: No acute distress   EYES -   Conjunctiva normal  Pupillary exam as below, see CN exam in the neurologic exam  ENT-    No scars, masses, or lesions over external nose or ears  Hearing normal bilaterally to finger rub  Cardiovascular -   No clubbing, cyanosis, or edema   Pulmonary-   Good expansion, normal effort without use of accessory muscles  Musculoskeletal    No significant wasting of muscles noted  Gait as below, see gait exam in the neurologic exam  Muscle strength, tone, stability as below. No bony deformities  Skin    Warm, dry, and intact to inspection and palpation.     No rash, erythema, or pallor  Psychiatric    Mood, affect, and behavior 02/22/2021    BILITOT 0.3 02/22/2021    ALKPHOS 101 02/22/2021    AST 31 02/22/2021    ALT 32 02/22/2021    LABGLOM >60 02/22/2021    GFRAA >59 02/22/2021       ASSESSMENT:  37 y. o. previously admitted with confusion, new onset seizure activity, abnormal CT/MRI, polysubstance abuse history currently on suboxone. Still noting headaches and intermittent confusion, possible breakthrough seizure activity noted. MRI with scattered atypical appearing white matter abnormalities noted posteriorly, felt most likely PRES given non-inflammatory CSF and negative extensive workup. No overt mass, abscess, or abnormal enhancement noted. Not overtly suggestive of PML. Opening pressure was elevated, but no overt papilledema, pseudotumor felt unlikely especially given MRI.     PLAN:  1.  Will re-try repeat MRI Brain  2. She is not on any medications that would induce PRES, but question if could be elicit drug related. No history of immunocompromise or taking immunosuppressants. Blood pressure may also be playing a role, has been elevated at home, elevated in clinic today, will have the patient follow up with primary to maximize treatment there. 3.  Continue Keppra increase to 1000 mg bid. 4.  Epilepsy precautions and seizure first aid discussed. No driving, heights, swimming, tub baths, open flames, or heavy machinery. 5.  Opening pressure elevated, may be PRES related, no papilledema, pseudotumor felt unlikely. 6.  Completed doxycycline for Lyme testing abnormality but unclear if clinically significant. CSF lyme testing was negative. 7.  Follow up with primary to maximize the treatment of her anxiety and depression, on paxil, no SI/HI today.        Babs Cai DO  Board Certified Neurology

## 2022-03-23 ENCOUNTER — APPOINTMENT (OUTPATIENT)
Dept: GENERAL RADIOLOGY | Age: 37
End: 2022-03-23
Payer: MEDICAID

## 2022-03-23 ENCOUNTER — APPOINTMENT (OUTPATIENT)
Dept: CT IMAGING | Age: 37
End: 2022-03-23
Payer: MEDICAID

## 2022-03-23 ENCOUNTER — HOSPITAL ENCOUNTER (EMERGENCY)
Age: 37
Discharge: HOME | End: 2022-03-23
Payer: MEDICAID

## 2022-03-23 VITALS
WEIGHT: 285.2 LBS | HEART RATE: 65 BPM | RESPIRATION RATE: 18 BRPM | SYSTOLIC BLOOD PRESSURE: 118 MMHG | BODY MASS INDEX: 43.22 KG/M2 | OXYGEN SATURATION: 92 % | DIASTOLIC BLOOD PRESSURE: 80 MMHG | TEMPERATURE: 98.4 F | HEIGHT: 68 IN

## 2022-03-23 DIAGNOSIS — R06.02 SHORTNESS OF BREATH: Primary | ICD-10-CM

## 2022-03-23 LAB
ADENOVIRUS BY PCR: NOT DETECTED
ALBUMIN SERPL-MCNC: 4.8 G/DL (ref 3.5–5.2)
ALP BLD-CCNC: 104 U/L (ref 35–104)
ALT SERPL-CCNC: 42 U/L (ref 5–33)
ANION GAP SERPL CALCULATED.3IONS-SCNC: 13 MMOL/L (ref 7–19)
AST SERPL-CCNC: 50 U/L (ref 5–32)
BASOPHILS ABSOLUTE: 0.1 K/UL (ref 0–0.2)
BASOPHILS RELATIVE PERCENT: 0.7 % (ref 0–1)
BILIRUB SERPL-MCNC: <0.2 MG/DL (ref 0.2–1.2)
BORDETELLA PARAPERTUSSIS BY PCR: NOT DETECTED
BORDETELLA PERTUSSIS BY PCR: NOT DETECTED
BUN BLDV-MCNC: 10 MG/DL (ref 6–20)
CALCIUM SERPL-MCNC: 9.7 MG/DL (ref 8.6–10)
CHLAMYDOPHILIA PNEUMONIAE BY PCR: NOT DETECTED
CHLORIDE BLD-SCNC: 99 MMOL/L (ref 98–111)
CO2: 29 MMOL/L (ref 22–29)
CORONAVIRUS 229E BY PCR: NOT DETECTED
CORONAVIRUS HKU1 BY PCR: NOT DETECTED
CORONAVIRUS NL63 BY PCR: NOT DETECTED
CORONAVIRUS OC43 BY PCR: NOT DETECTED
CREAT SERPL-MCNC: 0.8 MG/DL (ref 0.5–0.9)
D DIMER: 0.48 UG/ML FEU (ref 0–0.48)
EOSINOPHILS ABSOLUTE: 0.3 K/UL (ref 0–0.6)
EOSINOPHILS RELATIVE PERCENT: 3.2 % (ref 0–5)
GFR AFRICAN AMERICAN: >59
GFR NON-AFRICAN AMERICAN: >60
GLUCOSE BLD-MCNC: 124 MG/DL (ref 74–109)
HCT VFR BLD CALC: 47.6 % (ref 37–47)
HEMOGLOBIN: 14.8 G/DL (ref 12–16)
HUMAN METAPNEUMOVIRUS BY PCR: NOT DETECTED
HUMAN RHINOVIRUS/ENTEROVIRUS BY PCR: NOT DETECTED
IMMATURE GRANULOCYTES #: 0 K/UL
INFLUENZA A BY PCR: NOT DETECTED
INFLUENZA B BY PCR: NOT DETECTED
LIPASE: 13 U/L (ref 13–60)
LYMPHOCYTES ABSOLUTE: 3.5 K/UL (ref 1.1–4.5)
LYMPHOCYTES RELATIVE PERCENT: 38.6 % (ref 20–40)
MCH RBC QN AUTO: 28.7 PG (ref 27–31)
MCHC RBC AUTO-ENTMCNC: 31.1 G/DL (ref 33–37)
MCV RBC AUTO: 92.2 FL (ref 81–99)
MONOCYTES ABSOLUTE: 0.6 K/UL (ref 0–0.9)
MONOCYTES RELATIVE PERCENT: 6.8 % (ref 0–10)
MYCOPLASMA PNEUMONIAE BY PCR: NOT DETECTED
NEUTROPHILS ABSOLUTE: 4.6 K/UL (ref 1.5–7.5)
NEUTROPHILS RELATIVE PERCENT: 50.3 % (ref 50–65)
PARAINFLUENZA VIRUS 1 BY PCR: NOT DETECTED
PARAINFLUENZA VIRUS 2 BY PCR: NOT DETECTED
PARAINFLUENZA VIRUS 3 BY PCR: NOT DETECTED
PARAINFLUENZA VIRUS 4 BY PCR: NOT DETECTED
PDW BLD-RTO: 14.4 % (ref 11.5–14.5)
PLATELET # BLD: 203 K/UL (ref 130–400)
PMV BLD AUTO: 11.4 FL (ref 9.4–12.3)
POTASSIUM REFLEX MAGNESIUM: 4.2 MMOL/L (ref 3.5–5)
PRO-BNP: 155 PG/ML (ref 0–450)
RBC # BLD: 5.16 M/UL (ref 4.2–5.4)
RESPIRATORY SYNCYTIAL VIRUS BY PCR: NOT DETECTED
SARS-COV-2, PCR: NOT DETECTED
SODIUM BLD-SCNC: 141 MMOL/L (ref 136–145)
TOTAL PROTEIN: 7.5 G/DL (ref 6.6–8.7)
TROPONIN: <0.01 NG/ML (ref 0–0.03)
WBC # BLD: 9.2 K/UL (ref 4.8–10.8)

## 2022-03-23 PROCEDURE — 71045 X-RAY EXAM CHEST 1 VIEW: CPT

## 2022-03-23 PROCEDURE — 93005 ELECTROCARDIOGRAM TRACING: CPT | Performed by: PHYSICIAN ASSISTANT

## 2022-03-23 PROCEDURE — 85025 COMPLETE CBC W/AUTO DIFF WBC: CPT

## 2022-03-23 PROCEDURE — 99283 EMERGENCY DEPT VISIT LOW MDM: CPT

## 2022-03-23 PROCEDURE — 84484 ASSAY OF TROPONIN QUANT: CPT

## 2022-03-23 PROCEDURE — 80053 COMPREHEN METABOLIC PANEL: CPT

## 2022-03-23 PROCEDURE — 0202U NFCT DS 22 TRGT SARS-COV-2: CPT

## 2022-03-23 PROCEDURE — 70450 CT HEAD/BRAIN W/O DYE: CPT

## 2022-03-23 PROCEDURE — 83880 ASSAY OF NATRIURETIC PEPTIDE: CPT

## 2022-03-23 PROCEDURE — 85379 FIBRIN DEGRADATION QUANT: CPT

## 2022-03-23 PROCEDURE — 36415 COLL VENOUS BLD VENIPUNCTURE: CPT

## 2022-03-23 PROCEDURE — 83690 ASSAY OF LIPASE: CPT

## 2022-03-23 RX ORDER — HYDROCHLOROTHIAZIDE 25 MG/1
25 TABLET ORAL DAILY
COMMUNITY

## 2022-03-23 ASSESSMENT — ENCOUNTER SYMPTOMS
SHORTNESS OF BREATH: 1
COUGH: 1
NAUSEA: 0
CONSTIPATION: 0
DIARRHEA: 0
PHOTOPHOBIA: 1
VOMITING: 0
ABDOMINAL PAIN: 0

## 2022-03-23 NOTE — Clinical Note
Angie Hickman was seen and treated in our emergency department on 3/23/2022. She may return to work on 03/26/2022. If you have any questions or concerns, please don't hesitate to call.       Kodi Smith

## 2022-03-23 NOTE — ED PROVIDER NOTES
PAST MEDICALHISTORY     Past Medical History:   Diagnosis Date    Anxiety     Depression     Hypertension     Lyme disease          SURGICAL HISTORY       Past Surgical History:   Procedure Laterality Date    CHOLECYSTECTOMY      DILATION AND CURETTAGE OF UTERUS      x2    HERNIA REPAIR      HYSTERECTOMY      TOOTH EXTRACTION  last week    TUBAL LIGATION           CURRENT MEDICATIONS     Discharge Medication List as of 3/23/2022  5:41 PM      CONTINUE these medications which have NOT CHANGED    Details   hydroCHLOROthiazide (HYDRODIURIL) 25 MG tablet Take 25 mg by mouth dailyHistorical Med      METHADONE HCL PO 70 mg Historical Med      levETIRAcetam (KEPPRA) 500 MG tablet Take 2 tablets by mouth 2 times daily, Disp-120 tablet, R-5Normal      ibuprofen (ADVIL;MOTRIN) 800 MG tablet Take 1 tablet by mouth as neededHistorical Med      lisinopril (PRINIVIL;ZESTRIL) 10 MG tablet Take 1 tablet by mouth daily, Disp-30 tablet,R-0Normal      PARoxetine (PAXIL) 40 MG tablet Take 40 mg by mouth every morningHistorical Med             ALLERGIES     Patient has no known allergies.     FAMILY HISTORY       Family History   Problem Relation Age of Onset    Diabetes Mother     Cancer Father           SOCIAL HISTORY       Social History     Socioeconomic History    Marital status:      Spouse name: None    Number of children: 3    Years of education: None    Highest education level: None   Occupational History    None   Tobacco Use    Smoking status: Current Every Day Smoker     Packs/day: 1.50     Types: Cigarettes    Smokeless tobacco: Never Used   Vaping Use    Vaping Use: Never used   Substance and Sexual Activity    Alcohol use: Yes     Comment: infrequent    Drug use: Yes     Types: Marijuana Jon Free)    Sexual activity: Yes     Partners: Male   Other Topics Concern    None   Social History Narrative    None     Social Determinants of Health     Financial Resource Strain:     Difficulty of Paying Living Expenses: Not on file   Food Insecurity:     Worried About Running Out of Food in the Last Year: Not on file    Ran Out of Food in the Last Year: Not on file   Transportation Needs:     Lack of Transportation (Medical): Not on file    Lack of Transportation (Non-Medical): Not on file   Physical Activity:     Days of Exercise per Week: Not on file    Minutes of Exercise per Session: Not on file   Stress:     Feeling of Stress : Not on file   Social Connections:     Frequency of Communication with Friends and Family: Not on file    Frequency of Social Gatherings with Friends and Family: Not on file    Attends Yarsanism Services: Not on file    Active Member of [address] [place] [address] [employer] or Organizations: Not on file    Attends Club or Organization Meetings: Not on file    Marital Status: Not on file   Intimate Partner Violence:     Fear of Current or Ex-Partner: Not on file    Emotionally Abused: Not on file    Physically Abused: Not on file    Sexually Abused: Not on file   Housing Stability:     Unable to Pay for Housing in the Last Year: Not on file    Number of [place] in the Last Year: Not on file    Unstable Housing in the Last Year: Not on file       SCREENINGS    [place] Coma Scale  Eye Opening: Spontaneous  Best Verbal Response: Oriented  Best Motor Response: Obeys commands  [place] Coma Scale Score: 15        PHYSICAL EXAM    (up to 7 for level 4, 8 or more for level 5)     ED Triage Vitals   BP Temp Temp Source Pulse Resp SpO2 Height Weight   03/23/22 1443 03/23/22 1443 03/23/22 1746 03/23/22 1443 03/23/22 1443 03/23/22 1443 03/23/22 1443 03/23/22 1443   (!) 171/113 97.2 °F (36.2 °C) Oral 83 18 95 % 5' 8\" (1.727 m) 285 lb 3.2 oz (129.4 kg)       Physical Exam  Vitals and nursing note reviewed. Constitutional:       General: She is not in acute distress. Appearance: She is well-developed. She is obese. She is not ill-appearing or toxic-appearing.    HENT:      Head: Normocephalic and atraumatic. Mouth/Throat:      Mouth: Mucous membranes are moist.      Pharynx: No pharyngeal swelling or oropharyngeal exudate. Eyes:      Extraocular Movements: Extraocular movements intact. Pupils: Pupils are equal, round, and reactive to light. Neck:      Thyroid: No thyromegaly. Vascular: No JVD. Cardiovascular:      Rate and Rhythm: Normal rate and regular rhythm. Pulses: Normal pulses. Heart sounds: Normal heart sounds. Pulmonary:      Effort: Pulmonary effort is normal. No respiratory distress. Breath sounds: Normal breath sounds. Chest:      Chest wall: No tenderness. Abdominal:      Palpations: Abdomen is soft. Tenderness: There is no abdominal tenderness. Musculoskeletal:      Cervical back: Normal range of motion and neck supple. Right lower leg: No tenderness. No edema. Left lower leg: No tenderness. No edema. Lymphadenopathy:      Cervical: No cervical adenopathy. Skin:     General: Skin is warm and dry. Neurological:      General: No focal deficit present. Mental Status: She is alert and oriented to person, place, and time. DIAGNOSTIC RESULTS     EKG: All EKG's areinterpreted by the Emergency Department Physician who either signs or Co-signs this chart in the absence of a cardiologist.    EKG interpreted by attending, normal sinus rhythm at a rate of 71, no STEMI or acute ischemia, normal P axis, , QTc 441    RADIOLOGY:  Non-plain film images such as CT, Ultrasound and MRI are read by the radiologist. Plain radiographic images are visualized and preliminarily interpreted bythe emergency physician with the below findings:    [address]   Final Result   1. No acute intracranial abnormality. Signed by Dr Casey Level   Final Result   No acute findings.    Signed by Dr Edgardo Napier:  Chris Myers - Abnormal; Notable for the following given to her and she verbalized understanding. All of her questions were answered. She will follow up with her primary care promptly for further work-up as needed. FINAL IMPRESSION      1. Shortness of breath          DISPOSITION/PLAN   DISPOSITION Decision To Discharge 03/23/2022 05:27:53 PM      PATIENT REFERRED TO:  [address]   [address] [place] [id number]  [phone]          DISCHARGE MEDICATIONS:  Discharge Medication List as of 3/23/2022  5:41 PM             (Please note that portions of this note were completed with a voice recognition program.  Efforts were made to edit thedictations but occasionally words are mis-transcribed.)    MICHELLE Wallis (electronically signed)     Oumar Jerry, [address]  03/23/22 1666

## 2022-03-24 LAB
EKG P AXIS: 61 DEGREES
EKG P-R INTERVAL: 160 MS
EKG Q-T INTERVAL: 402 MS
EKG QRS DURATION: 96 MS
EKG QTC CALCULATION (BAZETT): 421 MS
EKG T AXIS: 18 DEGREES

## 2022-06-26 ENCOUNTER — HOSPITAL ENCOUNTER (EMERGENCY)
Age: 37
Discharge: HOME | End: 2022-06-26
Payer: MEDICAID

## 2022-06-26 ENCOUNTER — APPOINTMENT (OUTPATIENT)
Dept: CT IMAGING | Age: 37
End: 2022-06-26
Payer: MEDICAID

## 2022-06-26 VITALS
SYSTOLIC BLOOD PRESSURE: 138 MMHG | WEIGHT: 280 LBS | OXYGEN SATURATION: 93 % | HEIGHT: 68 IN | HEART RATE: 80 BPM | BODY MASS INDEX: 42.44 KG/M2 | DIASTOLIC BLOOD PRESSURE: 74 MMHG | TEMPERATURE: 98.6 F | RESPIRATION RATE: 18 BRPM

## 2022-06-26 DIAGNOSIS — R11.2 NAUSEA AND VOMITING, INTRACTABILITY OF VOMITING NOT SPECIFIED, UNSPECIFIED VOMITING TYPE: ICD-10-CM

## 2022-06-26 DIAGNOSIS — R10.84 GENERALIZED ABDOMINAL PAIN: Primary | ICD-10-CM

## 2022-06-26 LAB
ALBUMIN SERPL-MCNC: 4.1 G/DL (ref 3.5–5.2)
ALP BLD-CCNC: 90 U/L (ref 35–104)
ALT SERPL-CCNC: 40 U/L (ref 5–33)
ANION GAP SERPL CALCULATED.3IONS-SCNC: 10 MMOL/L (ref 7–19)
AST SERPL-CCNC: 48 U/L (ref 5–32)
BASOPHILS ABSOLUTE: 0 K/UL (ref 0–0.2)
BASOPHILS RELATIVE PERCENT: 0.4 % (ref 0–1)
BILIRUB SERPL-MCNC: <0.2 MG/DL (ref 0.2–1.2)
BILIRUBIN URINE: NEGATIVE
BLOOD, URINE: NEGATIVE
BUN BLDV-MCNC: 9 MG/DL (ref 6–20)
CALCIUM SERPL-MCNC: 9.1 MG/DL (ref 8.6–10)
CHLORIDE BLD-SCNC: 102 MMOL/L (ref 98–111)
CLARITY: CLEAR
CO2: 26 MMOL/L (ref 22–29)
COLOR: YELLOW
CREAT SERPL-MCNC: 0.5 MG/DL (ref 0.5–0.9)
EOSINOPHILS ABSOLUTE: 0.3 K/UL (ref 0–0.6)
EOSINOPHILS RELATIVE PERCENT: 2.3 % (ref 0–5)
GFR AFRICAN AMERICAN: >59
GFR NON-AFRICAN AMERICAN: >60
GLUCOSE BLD-MCNC: 126 MG/DL (ref 74–109)
GLUCOSE URINE: NEGATIVE MG/DL
HCT VFR BLD CALC: 43.4 % (ref 37–47)
HEMOGLOBIN: 13.8 G/DL (ref 12–16)
IMMATURE GRANULOCYTES #: 0.1 K/UL
KETONES, URINE: NEGATIVE MG/DL
LEUKOCYTE ESTERASE, URINE: NEGATIVE
LIPASE: 104 U/L (ref 13–60)
LYMPHOCYTES ABSOLUTE: 2.5 K/UL (ref 1.1–4.5)
LYMPHOCYTES RELATIVE PERCENT: 22.2 % (ref 20–40)
MCH RBC QN AUTO: 28.7 PG (ref 27–31)
MCHC RBC AUTO-ENTMCNC: 31.8 G/DL (ref 33–37)
MCV RBC AUTO: 90.2 FL (ref 81–99)
MONOCYTES ABSOLUTE: 0.7 K/UL (ref 0–0.9)
MONOCYTES RELATIVE PERCENT: 6.5 % (ref 0–10)
NEUTROPHILS ABSOLUTE: 7.5 K/UL (ref 1.5–7.5)
NEUTROPHILS RELATIVE PERCENT: 68.1 % (ref 50–65)
NITRITE, URINE: NEGATIVE
PDW BLD-RTO: 14.4 % (ref 11.5–14.5)
PH UA: 8.5 (ref 5–8)
PLATELET # BLD: 206 K/UL (ref 130–400)
PMV BLD AUTO: 11.2 FL (ref 9.4–12.3)
POTASSIUM REFLEX MAGNESIUM: 4.8 MMOL/L (ref 3.5–5)
PROTEIN UA: NEGATIVE MG/DL
RBC # BLD: 4.81 M/UL (ref 4.2–5.4)
SODIUM BLD-SCNC: 138 MMOL/L (ref 136–145)
SPECIFIC GRAVITY UA: 1.04 (ref 1–1.03)
TOTAL PROTEIN: 6.6 G/DL (ref 6.6–8.7)
UROBILINOGEN, URINE: 0.2 E.U./DL
WBC # BLD: 11 K/UL (ref 4.8–10.8)

## 2022-06-26 PROCEDURE — 80053 COMPREHEN METABOLIC PANEL: CPT

## 2022-06-26 PROCEDURE — 36415 COLL VENOUS BLD VENIPUNCTURE: CPT

## 2022-06-26 PROCEDURE — 2580000003 HC RX 258: Performed by: NURSE PRACTITIONER

## 2022-06-26 PROCEDURE — 85025 COMPLETE CBC W/AUTO DIFF WBC: CPT

## 2022-06-26 PROCEDURE — 74177 CT ABD & PELVIS W/CONTRAST: CPT | Performed by: RADIOLOGY

## 2022-06-26 PROCEDURE — 99285 EMERGENCY DEPT VISIT HI MDM: CPT

## 2022-06-26 PROCEDURE — 6360000004 HC RX CONTRAST MEDICATION: Performed by: NURSE PRACTITIONER

## 2022-06-26 PROCEDURE — 74177 CT ABD & PELVIS W/CONTRAST: CPT

## 2022-06-26 PROCEDURE — 83690 ASSAY OF LIPASE: CPT

## 2022-06-26 PROCEDURE — 81003 URINALYSIS AUTO W/O SCOPE: CPT

## 2022-06-26 RX ORDER — SODIUM CHLORIDE, SODIUM LACTATE, POTASSIUM CHLORIDE, AND CALCIUM CHLORIDE .6; .31; .03; .02 G/100ML; G/100ML; G/100ML; G/100ML
1000 INJECTION, SOLUTION INTRAVENOUS ONCE
Status: COMPLETED | OUTPATIENT
Start: 2022-06-26 | End: 2022-06-26

## 2022-06-26 RX ORDER — ONDANSETRON 4 MG/1
4 TABLET, ORALLY DISINTEGRATING ORAL EVERY 8 HOURS PRN
Qty: 20 TABLET | Refills: 0 | Status: SHIPPED | OUTPATIENT
Start: 2022-06-26 | End: 2022-07-03

## 2022-06-26 RX ORDER — ONDANSETRON 2 MG/ML
4 INJECTION INTRAMUSCULAR; INTRAVENOUS ONCE
Status: DISCONTINUED | OUTPATIENT
Start: 2022-06-26 | End: 2022-06-26 | Stop reason: HOSPADM

## 2022-06-26 RX ADMIN — IOPAMIDOL 70 ML: 755 INJECTION, SOLUTION INTRAVENOUS at 12:08

## 2022-06-26 RX ADMIN — SODIUM CHLORIDE, SODIUM LACTATE, POTASSIUM CHLORIDE, AND CALCIUM CHLORIDE 1000 ML: 600; 310; 30; 20 INJECTION, SOLUTION INTRAVENOUS at 11:15

## 2022-06-26 ASSESSMENT — ENCOUNTER SYMPTOMS
NAUSEA: 1
SHORTNESS OF BREATH: 0
VOMITING: 1
COUGH: 0
ABDOMINAL PAIN: 1
DIARRHEA: 1
BACK PAIN: 0

## 2022-06-26 NOTE — ED PROVIDER NOTES
[facility] EMERGENCY DEPT  EMERGENCY DEPARTMENT ENCOUNTER      Pt Name: Delbert Rose  MRN: [id number]  [place] 1985  Date of evaluation: 6/26/2022  Provider: ROBERTO Klein [id number]       Chief Complaint   Patient presents with    Abdominal Pain         HISTORY OF PRESENT ILLNESS   (Location/Symptom, Timing/Onset, Context/Setting, Quality, Duration, Modifying Factors, Severity)  Note limiting factors. Delbert Rose is a 30 y.o. female who presents to the emergency department via EMS after waking up this am with nausea and vomiting. While she was vomiting she had crampy abd pain. En route she was given zofran and she feels better now other than being a little weak. She tells me this is similar to previous \"phantom gall bladder attacks\" as she calls them because they are similar to when she had pancreatitis and had her gall bladder out. She has no fever or chills. No cough or congestion. She did have an episode of non bloody diarrhea. Denies sick contacts. Denies recent suspicious food. HPI    Nursing Notes were reviewed. REVIEW OF SYSTEMS    (2-9 systems for level 4, 10 or more for level 5)     Review of Systems   Constitutional: Negative for chills and fever. HENT: Negative for congestion. Respiratory: Negative for cough and shortness of breath. Cardiovascular: Negative for chest pain and palpitations. Gastrointestinal: Positive for abdominal pain, diarrhea, nausea and vomiting. Genitourinary: Negative for dysuria, flank pain, frequency and urgency. Musculoskeletal: Negative for back pain and neck pain. Neurological: Negative for dizziness, syncope, light-headedness and headaches. Except as noted above the remainder of the review of systems was reviewed and negative.        PAST MEDICAL HISTORY     Past Medical History:   Diagnosis Date    Anxiety     Depression     Hypertension     Lyme disease          SURGICAL HISTORY       Past Surgical History: Procedure Laterality Date    CHOLECYSTECTOMY      DILATION AND CURETTAGE OF UTERUS      x2    HERNIA REPAIR      HYSTERECTOMY (CERVIX STATUS UNKNOWN)      TOOTH EXTRACTION  last week    TUBAL LIGATION           CURRENT MEDICATIONS       Previous Medications    HYDROCHLOROTHIAZIDE (HYDRODIURIL) 25 MG TABLET    Take 25 mg by mouth daily    IBUPROFEN (ADVIL;MOTRIN) 800 MG TABLET    Take 1 tablet by mouth as needed    LEVETIRACETAM (KEPPRA) 500 MG TABLET    Take 2 tablets by mouth 2 times daily    LISINOPRIL (PRINIVIL;ZESTRIL) 10 MG TABLET    Take 1 tablet by mouth daily    METHADONE HCL PO    70 mg     PAROXETINE (PAXIL) 40 MG TABLET    Take 40 mg by mouth every morning       ALLERGIES     Patient has no known allergies. FAMILY HISTORY       Family History   Problem Relation Age of Onset    Diabetes Mother     Cancer Father           SOCIAL HISTORY       Social History     Socioeconomic History    Marital status:      Spouse name: None    Number of children: 3    Years of education: None    Highest education level: None   Occupational History    None   Tobacco Use    Smoking status: Current Every Day Smoker     Packs/day: 1.50     Types: Cigarettes    Smokeless tobacco: Never Used   Vaping Use    Vaping Use: Never used   Substance and Sexual Activity    Alcohol use: Yes     Comment: infrequent    Drug use: Yes     Types: Marijuana Gaynelle Mikana)    Sexual activity: Yes     Partners: Male   Other Topics Concern    None   Social History Narrative    None     Social Determinants of Health     Financial Resource Strain:     Difficulty of Paying Living Expenses: Not on file   Food Insecurity:     Worried About Running Out of Food in the Last Year: Not on file    Helen of Food in the Last Year: Not on file   Transportation Needs:     Lack of Transportation (Medical): Not on file    Lack of Transportation (Non-Medical):  Not on file   Physical Activity:     Days of Exercise per Week: Not on file    Minutes of Exercise per Session: Not on file   Stress:     Feeling of Stress : Not on file   Social Connections:     Frequency of Communication with Friends and Family: Not on file    Frequency of Social Gatherings with Friends and Family: Not on file    Attends [employer] Services: Not on file    Active Member of [address] [place] [address] or Organizations: Not on file    Attends Club or Organization Meetings: Not on file    Marital Status: Not on file   Intimate Partner Violence:     Fear of Current or Ex-Partner: Not on file    Emotionally Abused: Not on file    Physically Abused: Not on file    Sexually Abused: Not on file   Housing Stability:     Unable to Pay for Housing in the Last Year: Not on file    Number of [place] in the Last Year: Not on file    Unstable Housing in the Last Year: Not on file       SCREENINGS         [place] Coma Scale  Eye Opening: Spontaneous  Best Verbal Response: Oriented  Best Motor Response: Obeys commands  D[place]orah Coma Scale Score: 15                     CIWA Assessment  BP: 138/74  Heart Rate: 80                 PHYSICAL EXAM    (up to 7 for level 4, 8 or more for level 5)     ED Triage Vitals   BP Temp Temp Source Heart Rate Resp SpO2 Height Weight   06/26/22 0943 06/26/22 0943 06/26/22 0943 06/26/22 0943 06/26/22 0943 06/26/22 0943 06/26/22 0942 06/26/22 0942   116/79 98.6 °F (37 °C) Oral 75 20 94 % 5' 8\" (1.727 m) 280 lb (127 kg)       Physical Exam  Vitals reviewed. Constitutional:       General: She is not in acute distress. Appearance: She is well-developed. She is obese. She is not ill-appearing, toxic-appearing or diaphoretic. HENT:      Head: Normocephalic and atraumatic. Mouth/Throat:      Mouth: Mucous membranes are moist.   Cardiovascular:      Rate and Rhythm: Normal rate and regular rhythm. Heart sounds: Normal heart sounds. Pulmonary:      Effort: Pulmonary effort is normal.      Breath sounds: Normal breath sounds.    Abdominal: General: Bowel sounds are normal.      Palpations: Abdomen is soft. Tenderness: There is no abdominal tenderness. There is no right CVA tenderness or left CVA tenderness. Skin:     General: Skin is warm and dry. Capillary Refill: Capillary refill takes less than 2 seconds. Neurological:      General: No focal deficit present. Mental Status: She is alert and oriented to person, place, and time. DIAGNOSTIC RESULTS     EKG: All EKG's are interpreted by the Emergency Department Physician who either signs or Co-signs this chart in the absence of a cardiologist.      RADIOLOGY:   Non-plain film images such as CT, Ultrasound and MRI are read by the radiologist. Plain radiographic images are visualized and preliminarily interpreted by the emergency physician with the below findings:      Interpretation per the Radiologist below, if available at the time of this note:    CT ABDOMEN PELVIS W IV CONTRAST Additional Contrast? None   Final Result   1. No acute intra-abdominal abnormality. 2. Moderate fatty liver with hepatomegaly. 3. Supraumbilical and umbilical hernias with non acute omentum as herniating contents. Recommendation: Follow up as clinically indicated. All CT scans at this facility utilize dose modulation, iterative reconstruction, and/or weight based dosing when appropriate to reduce radiation dose to as low as reasonably achievable.     Electronically Signed by Dalton Trivedi MD at 26-Jun-2022 01:49:50 PM               [address]    (Results Pending)         ED BEDSIDE ULTRASOUND:   Performed by ED Physician - none    LABS:  Labs Reviewed   CBC WITH AUTO DIFFERENTIAL - Abnormal; Notable for the following components:       Result Value    WBC 11.0 (*)     MCHC 31.8 (*)     Neutrophils % 68.1 (*)     All other components within normal limits   COMPREHENSIVE METABOLIC PANEL W/ REFLEX TO MG FOR LOW K - Abnormal; Notable for the following components:    Glucose 126 (*) ALT 40 (*)     AST 48 (*)     All other components within normal limits   LIPASE - Abnormal; Notable for the following components:    Lipase 104 (*)     All other components within normal limits   URINALYSIS WITH REFLEX TO CULTURE - Abnormal; Notable for the following components:    pH, UA 8.5 (*)     All other components within normal limits       All other labs were within normal range or not returned as of this dictation. EMERGENCY DEPARTMENT COURSE and DIFFERENTIAL DIAGNOSIS/MDM:   Vitals:    Vitals:    06/26/22 0942 06/26/22 0943 06/26/22 1115 06/26/22 1315   BP:  116/79 (!) 143/78 138/74   Pulse:  75 77 80   Resp:  20 18 18   Temp:  98.6 °F (37 °C)     TempSrc:  Oral     SpO2:  94% 90% 93%   Weight: 280 lb (127 kg)      Height: 5' 8\" (1.727 m)            MDM      REASSESSMENT     ED Course as of 06/26/22 1323   Sun Jun 26, 2022   1310 Continues to feel better. Discussed findings. Comfortable with dispo home. Voices understanding to treatment, follow up and return parameters. Requests [facility] in [address] [place] [address] for pharmachy [BW]      ED Course User Index  [BW] Urz[address]iz 12, APRN - CNP         CRITICAL CARE TIME       CONSULTS:  None    PROCEDURES:  Unless otherwise noted below, none     Procedures         FINAL IMPRESSION      1. Generalized abdominal pain    2. Nausea and vomiting, intractability of vomiting not specified, unspecified vomiting type          DISPOSITION/PLAN   DISPOSITION        PATIENT REFERRED TO:      Schedule an appointment as soon as possible for a visit in 3 days  Follow up within 3 days, Return to ED sooner if symptoms worsen      DISCHARGE MEDICATIONS:  New Prescriptions    ONDANSETRON (ZOFRAN-ODT) 4 MG DISINTEGRATING TABLET    Place 1 tablet under the tongue every 8 hours as needed for Nausea or Vomiting May Sub regular tablet (non-ODT) if insurance does not cover ODT. Controlled Substances Monitoring:     No flowsheet data found.     (Please note that portions of this note were completed with a voice recognition program.  Efforts were made to edit the dictations but occasionally words are mis-transcribed.)    ROBERTO Cole CNP (electronically signed)  Attending Emergency Physician         ROBERTO Cole CNP  06/26/22 0050

## 2022-07-24 ENCOUNTER — HOSPITAL ENCOUNTER (EMERGENCY)
Age: 37
Discharge: LEFT BEFORE DISCHARGE | End: 2022-07-24
Attending: EMERGENCY MEDICINE

## 2022-07-24 VITALS
RESPIRATION RATE: 16 BRPM | BODY MASS INDEX: 39.53 KG/M2 | HEART RATE: 70 BPM | TEMPERATURE: 98.1 F | OXYGEN SATURATION: 100 % | DIASTOLIC BLOOD PRESSURE: 88 MMHG | SYSTOLIC BLOOD PRESSURE: 126 MMHG | WEIGHT: 260 LBS

## 2022-07-24 NOTE — ED NOTES
Pt called out, went in to assist pt and pt standing at sink drinking water, instructed pt that she could not drink water, pt began cussing me and stated \"no one would fucking help me, Im not drinking the fucking water, Im rinsing out my mouth. \"  This nurse and Shayy, PCA explained to pt that we would help her but that she needs to quit cussing us and get back in the bed. Pt then stated \"yall are all fucking rude\" pt then said she wanted to leave. IV access removed. Pt walked out of HENNY Sauceda, RN  07/24/22 0292

## 2023-02-07 PROCEDURE — G0123 SCREEN CERV/VAG THIN LAYER: HCPCS | Performed by: PHYSICIAN ASSISTANT

## 2023-02-07 PROCEDURE — 87624 HPV HI-RISK TYP POOLED RSLT: CPT | Performed by: PHYSICIAN ASSISTANT

## 2023-02-07 PROCEDURE — 87491 CHLMYD TRACH DNA AMP PROBE: CPT | Performed by: PHYSICIAN ASSISTANT

## 2023-02-07 PROCEDURE — 87591 N.GONORRHOEAE DNA AMP PROB: CPT | Performed by: PHYSICIAN ASSISTANT

## 2023-02-09 ENCOUNTER — LAB REQUISITION (OUTPATIENT)
Dept: LAB | Facility: HOSPITAL | Age: 38
End: 2023-02-09
Payer: COMMERCIAL

## 2023-02-09 DIAGNOSIS — Z01.419 ENCOUNTER FOR GYNECOLOGICAL EXAMINATION (GENERAL) (ROUTINE) WITHOUT ABNORMAL FINDINGS: ICD-10-CM

## 2023-02-09 LAB
C TRACH RRNA CVX QL NAA+PROBE: NOT DETECTED
N GONORRHOEA RRNA SPEC QL NAA+PROBE: NOT DETECTED

## 2023-02-10 LAB
GEN CATEG CVX/VAG CYTO-IMP: NORMAL
HPV I/H RISK 4 DNA CVX QL PROBE+SIG AMP: NOT DETECTED
LAB AP CASE REPORT: NORMAL
LAB AP GYN ADDITIONAL INFORMATION: NORMAL
Lab: NORMAL
PATH INTERP SPEC-IMP: NORMAL
STAT OF ADQ CVX/VAG CYTO-IMP: NORMAL

## 2023-03-17 ENCOUNTER — TELEPHONE (OUTPATIENT)
Dept: NEUROLOGY | Age: 38
End: 2023-03-17

## 2023-03-17 NOTE — TELEPHONE ENCOUNTER
Received an est. Pt referral for patient to get a follow up with Dr. Mortimer Manuel. Called patient to schedule f/u. Had to leave voicemail for patient to call office back. [phone]. Patient can be scheduled next available 30 minute spot with Dr. Grisel Soriano.